# Patient Record
Sex: FEMALE | Race: WHITE | NOT HISPANIC OR LATINO | Employment: FULL TIME | URBAN - NONMETROPOLITAN AREA
[De-identification: names, ages, dates, MRNs, and addresses within clinical notes are randomized per-mention and may not be internally consistent; named-entity substitution may affect disease eponyms.]

---

## 2020-11-24 ENCOUNTER — OFFICE VISIT (OUTPATIENT)
Dept: CARDIOLOGY | Facility: CLINIC | Age: 40
End: 2020-11-24

## 2020-11-24 VITALS
WEIGHT: 159 LBS | HEART RATE: 92 BPM | DIASTOLIC BLOOD PRESSURE: 88 MMHG | HEIGHT: 65 IN | SYSTOLIC BLOOD PRESSURE: 124 MMHG | BODY MASS INDEX: 26.49 KG/M2

## 2020-11-24 DIAGNOSIS — E04.9 GOITER: ICD-10-CM

## 2020-11-24 DIAGNOSIS — R07.89 CHEST PRESSURE: Primary | ICD-10-CM

## 2020-11-24 DIAGNOSIS — I45.10 RBBB: ICD-10-CM

## 2020-11-24 DIAGNOSIS — E78.00 HYPERCHOLESTEREMIA: ICD-10-CM

## 2020-11-24 DIAGNOSIS — R51.9 HEADACHE DISORDER: ICD-10-CM

## 2020-11-24 DIAGNOSIS — R01.1 MURMUR, CARDIAC: ICD-10-CM

## 2020-11-24 DIAGNOSIS — R00.2 PALPITATIONS: ICD-10-CM

## 2020-11-24 PROCEDURE — 93000 ELECTROCARDIOGRAM COMPLETE: CPT | Performed by: INTERNAL MEDICINE

## 2020-11-24 PROCEDURE — 99244 OFF/OP CNSLTJ NEW/EST MOD 40: CPT | Performed by: INTERNAL MEDICINE

## 2020-11-24 RX ORDER — VORTIOXETINE 20 MG/1
20 TABLET, FILM COATED ORAL DAILY
COMMUNITY
Start: 2020-11-18

## 2020-11-24 NOTE — PROGRESS NOTES
Chief Complaint   Patient presents with   • Establish Care     Referred for CP and known history of incomplete RBBB   • Cardiac history     Seen Dr. KATT Jensen in 2013, she thinks had echo.  She was pregnant at that time and was on labetalol at that time.   • Labs     patient [facility]   • ASA     not on ASA   • Chest Pain     patient had one episode on 10/21/2020. started in center of chest and radiated to right side chest. last all day.  The next day symptoms were better. It has not reoccurred.  Dr. Casper recommended a check up with her history of incomplete RBBB        CARDIAC COMPLAINTS  chest pressure/discomfort and Headache      Subjective   Crescencio Kapadia is a 40 y.o. female came in today for her initial cardiac evaluation.  She has history of hypertension which was diagnosed when she was pregnant in 2013.  At that time she had preeclampsia and also has history of tachycardia.  She was seen by another cardiologist at that time she was treated with labetalol during the pregnancy which was stopped after the pregnancy is over.  She did not have any symptoms after that.  She has done fairly well for the last 7 years.  She had an episode of significant chest pain which started in the central part of the chest radiated to the right part of the chest.  It happened about a month ago.  The symptom lasted for almost a whole day.  It started in the morning when she was at home.  It persisted while she was at work.  In a scale of 1-10 it was around four.  He did not have any change in the intensity during the work.  She was seen at your office and the EKG apparently did not show any new changes other than the old right bundle branch block.  She is not sure that she had any labs done.  She is now referred for the evaluation of the chest discomfort.  She also complained of having multiple episode of intermittent migraine headache with an aura.  She sometimes takes medication if the symptom gets worse and  occasionally has to go home to rest.  She also has history of mild anxiety and depression for which she does take medication.  [facility] showed cholesterol is 209 but the LDL was 133.  Her TSH was normal at 2.76.  Her electrolytes were normal.  Her hemoglobin hematocrit was normal..  She has no history of smoking.  She drinks alcohol occasionally.  Both her parents have hypertension.    History reviewed. No pertinent surgical history.    Current Outpatient Medications   Medication Sig Dispense Refill   • Trintellix 20 MG tablet Take 20 mg by mouth Daily.       No current facility-administered medications for this visit.            ALLERGIES:  Patient has no known allergies.    Past Medical History:   Diagnosis Date   • Depression    • Hiatal hernia    • Hx of tonsillectomy 2012   • Hypertension    • Insomnia        Social History     Tobacco Use   Smoking Status Never Smoker   Smokeless Tobacco Never Used          Family History   Problem Relation Age of Onset   • Hypertension Mother    • Hypertension Father        Review of Systems   Constitution: Negative for decreased appetite and malaise/fatigue.   HENT: Negative for congestion and sore throat.    Eyes: Negative for blurred vision.   Cardiovascular: Positive for chest pain and palpitations.   Respiratory: Negative for shortness of breath and snoring.    Endocrine: Negative for cold intolerance and heat intolerance.   Hematologic/Lymphatic: Negative for adenopathy. Does not bruise/bleed easily.   Skin: Negative for itching, nail changes and skin cancer.   Musculoskeletal: Negative for arthritis and myalgias.   Gastrointestinal: Negative for abdominal pain, dysphagia and heartburn.   Genitourinary: Negative for bladder incontinence and frequency.   Neurological: Positive for headaches. Negative for dizziness, light-headedness, seizures and vertigo.   Psychiatric/Behavioral: Negative for altered mental status.   Allergic/Immunologic:  "Negative for environmental allergies and hives.       Diabetes- No  Thyroid- normal    Objective     /88   Pulse 92   Ht 165.1 cm (65\")   Wt 72.1 kg (159 lb)   BMI 26.46 kg/m²     Vitals signs and nursing note reviewed.   Constitutional:       Appearance: Healthy appearance. Not in distress.   Eyes:      Conjunctiva/sclera: Conjunctivae normal.      Pupils: Pupils are equal, round, and reactive to light.   HENT:      Head: Normocephalic.   Neck:      Musculoskeletal: Normal range of motion and neck supple.      Thyroid: Thyroid normal.   Pulmonary:      Effort: Pulmonary effort is normal.      Breath sounds: Normal breath sounds.   Cardiovascular:      PMI at left midclavicular line. Normal rate. Regular rhythm.      Murmurs: There is a grade 3/6 mid frequency midsystolic murmur at the URSB and apex.   Abdominal:      General: Bowel sounds are normal.      Palpations: Abdomen is soft.   Musculoskeletal: Normal range of motion.   Skin:     General: Skin is warm and dry.   Neurological:      Mental Status: Alert, oriented to person, place, and time and oriented to person, place and time.           ECG 12 Lead    Date/Time: 11/24/2020 5:08 PM  Performed by: Jerry De Souza MD  Authorized by: Jerry De Souza MD   Comparison: compared with previous ECG from 10/21/2020  Similar to previous ECG  Rhythm: sinus rhythm  Rate: normal  Conduction: right bundle branch block  QRS axis: normal    Clinical impression: abnormal EKG              Assessment/Plan   Patient's Body mass index is 26.46 kg/m². BMI is above normal parameters. Recommendations include: nutrition counseling.     Diagnoses and all orders for this visit:    1. Chest pressure (Primary)  -     Stress Test With Myocardial Perfusion One Day; Future    2. RBBB  -     Stress Test With Myocardial Perfusion One Day; Future    3. Palpitations  -     Adult Transthoracic Echo Complete W/ Cont if Necessary Per Protocol; Future    4. Headache disorder  -  "    Adult Transthoracic Echo Complete W/ Cont if Necessary Per Protocol; Future    5. Murmur, cardiac  -     Adult Transthoracic Echo Complete W/ Cont if Necessary Per Protocol; Future    6. Hypercholesteremia    7. Goiter  -     US thyroid; Future    On clinical examination her heart rate is upper limit of normal.  Her blood pressure is normal.  Her EKG showed sinus rhythm, right bundle branch block, ME interval is slightly short.  Her clinical examination reveals a BMI close to 26.  She does have a small goiter involving the right part of the thyroid.  Her cardiovascular examination is unremarkable other than a short systolic murmur at the mitral and the tricuspid area.  She has normal peripheral pulse and no pedal edema.    Regarding the chest pressure, given her abnormal EKG she needs cardiac work-up including a stress test to evaluate her functional status, chronotropic response, blood pressure response and rule out any stress-induced ischemia.  If it is normal she might benefit using PPI history of blockers as needed    Regarding her right bundle branch block and with her history of headache disorders, need to rule out PFO or ASD.  I advised her to undergo an echocardiogram to evaluate the LV function, valvular structures, PA pressure and also look for a shunt by doing it with saline injection    Regarding the palpitation which she gets occasionally, if she start getting more often then she may need to have a Holter monitor external monitor placed.  Also will evaluate during the stress test whether she does get any arrhythmia.    Regarding her cholesterol which is slightly elevated, I had a long talk with her about diet and weight reduction.  I talked to her about cutting down on the fried food and carbohydrate intake.    Regarding the small goiter, I advised her to check an ultrasound of the thyroid.               Electronically signed by Jerry De Souza MD November 24, 2020 17:01 EST

## 2020-12-18 ENCOUNTER — HOSPITAL ENCOUNTER (OUTPATIENT)
Dept: CARDIOLOGY | Facility: HOSPITAL | Age: 40
Discharge: HOME | End: 2020-12-18

## 2020-12-18 VITALS — HEIGHT: 65 IN | BODY MASS INDEX: 26.48 KG/M2 | WEIGHT: 158.95 LBS

## 2020-12-18 DIAGNOSIS — R07.89 CHEST PRESSURE: ICD-10-CM

## 2020-12-18 DIAGNOSIS — R51.9 HEADACHE DISORDER: ICD-10-CM

## 2020-12-18 DIAGNOSIS — R01.1 MURMUR, CARDIAC: ICD-10-CM

## 2020-12-18 DIAGNOSIS — R00.2 PALPITATIONS: ICD-10-CM

## 2020-12-18 DIAGNOSIS — I45.10 RBBB: ICD-10-CM

## 2020-12-18 LAB
AORTIC DIMENSIONLESS INDEX: 0.8 (DI)
BH CV ECHO MEAS - ACS: 1.7 CM
BH CV ECHO MEAS - AO MAX PG (FULL): 1.9 MMHG
BH CV ECHO MEAS - AO MAX PG: 5.9 MMHG
BH CV ECHO MEAS - AO MEAN PG (FULL): 1 MMHG
BH CV ECHO MEAS - AO MEAN PG: 3 MMHG
BH CV ECHO MEAS - AO ROOT AREA (BSA CORRECTED): 1.6
BH CV ECHO MEAS - AO ROOT AREA: 6.6 CM^2
BH CV ECHO MEAS - AO ROOT DIAM: 2.9 CM
BH CV ECHO MEAS - AO V2 MAX: 121 CM/SEC
BH CV ECHO MEAS - AO V2 MEAN: 82.8 CM/SEC
BH CV ECHO MEAS - AO V2 VTI: 26.6 CM
BH CV ECHO MEAS - BSA(HAYCOCK): 1.8 M^2
BH CV ECHO MEAS - BSA: 1.8 M^2
BH CV ECHO MEAS - BZI_BMI: 26.5 KILOGRAMS/M^2
BH CV ECHO MEAS - BZI_METRIC_HEIGHT: 165.1 CM
BH CV ECHO MEAS - BZI_METRIC_WEIGHT: 72.1 KG
BH CV ECHO MEAS - EDV(CUBED): 84.6 ML
BH CV ECHO MEAS - EDV(TEICH): 87.2 ML
BH CV ECHO MEAS - EF(CUBED): 74.1 %
BH CV ECHO MEAS - EF(TEICH): 66.1 %
BH CV ECHO MEAS - ESV(CUBED): 22 ML
BH CV ECHO MEAS - ESV(TEICH): 29.6 ML
BH CV ECHO MEAS - FS: 36.2 %
BH CV ECHO MEAS - IVS/LVPW: 1.1
BH CV ECHO MEAS - IVSD: 1.1 CM
BH CV ECHO MEAS - LA DIMENSION: 3.2 CM
BH CV ECHO MEAS - LA/AO: 1.1
BH CV ECHO MEAS - LAT PEAK E' VEL: 14.6 CM/SEC
BH CV ECHO MEAS - LV IVRT: 0.1 SEC
BH CV ECHO MEAS - LV MASS(C)D: 152.4 GRAMS
BH CV ECHO MEAS - LV MASS(C)DI: 84.9 GRAMS/M^2
BH CV ECHO MEAS - LV MAX PG: 3.9 MMHG
BH CV ECHO MEAS - LV MEAN PG: 2 MMHG
BH CV ECHO MEAS - LV V1 MAX: 99.2 CM/SEC
BH CV ECHO MEAS - LV V1 MEAN: 60.8 CM/SEC
BH CV ECHO MEAS - LV V1 VTI: 22.4 CM
BH CV ECHO MEAS - LVIDD: 4.4 CM
BH CV ECHO MEAS - LVIDS: 2.8 CM
BH CV ECHO MEAS - LVPWD: 0.98 CM
BH CV ECHO MEAS - MED PEAK E' VEL: 11.2 CM/SEC
BH CV ECHO MEAS - MV A MAX VEL: 63.7 CM/SEC
BH CV ECHO MEAS - MV DEC SLOPE: 409 CM/SEC^2
BH CV ECHO MEAS - MV DEC TIME: 0.22 SEC
BH CV ECHO MEAS - MV E MAX VEL: 89.6 CM/SEC
BH CV ECHO MEAS - MV E/A: 1.4
BH CV ECHO MEAS - MV MAX PG: 3 MMHG
BH CV ECHO MEAS - MV MEAN PG: 1 MMHG
BH CV ECHO MEAS - MV P1/2T MAX VEL: 99.5 CM/SEC
BH CV ECHO MEAS - MV P1/2T: 71.3 MSEC
BH CV ECHO MEAS - MV V2 MAX: 86.1 CM/SEC
BH CV ECHO MEAS - MV V2 MEAN: 53 CM/SEC
BH CV ECHO MEAS - MV V2 VTI: 27.2 CM
BH CV ECHO MEAS - MVA P1/2T LCG: 2.2 CM^2
BH CV ECHO MEAS - MVA(P1/2T): 3.1 CM^2
BH CV ECHO MEAS - PA MAX PG (FULL): 0.35 MMHG
BH CV ECHO MEAS - PA MAX PG: 5 MMHG
BH CV ECHO MEAS - PA MEAN PG (FULL): 0 MMHG
BH CV ECHO MEAS - PA MEAN PG: 2 MMHG
BH CV ECHO MEAS - PA V2 MAX: 112 CM/SEC
BH CV ECHO MEAS - PA V2 MEAN: 70 CM/SEC
BH CV ECHO MEAS - PA V2 VTI: 24.1 CM
BH CV ECHO MEAS - RAP SYSTOLE: 10 MMHG
BH CV ECHO MEAS - RV MAX PG: 4.7 MMHG
BH CV ECHO MEAS - RV MEAN PG: 2 MMHG
BH CV ECHO MEAS - RV V1 MAX: 108 CM/SEC
BH CV ECHO MEAS - RV V1 MEAN: 62.1 CM/SEC
BH CV ECHO MEAS - RV V1 VTI: 21.6 CM
BH CV ECHO MEAS - RVDD: 2.5 CM
BH CV ECHO MEAS - RVSP: 22 MMHG
BH CV ECHO MEAS - SI(AO): 97.9 ML/M^2
BH CV ECHO MEAS - SI(CUBED): 34.9 ML/M^2
BH CV ECHO MEAS - SI(TEICH): 32.1 ML/M^2
BH CV ECHO MEAS - SV(AO): 175.7 ML
BH CV ECHO MEAS - SV(CUBED): 62.7 ML
BH CV ECHO MEAS - SV(TEICH): 57.7 ML
BH CV ECHO MEAS - TR MAX VEL: 170 CM/SEC
BH CV ECHO MEASUREMENTS AVERAGE E/E' RATIO: 6.95
MAXIMAL PREDICTED HEART RATE: 180 BPM
STRESS TARGET HR: 153 BPM

## 2020-12-18 PROCEDURE — 78452 HT MUSCLE IMAGE SPECT MULT: CPT

## 2020-12-18 PROCEDURE — 93306 TTE W/DOPPLER COMPLETE: CPT | Performed by: INTERNAL MEDICINE

## 2020-12-18 PROCEDURE — A9500 TC99M SESTAMIBI: HCPCS | Performed by: INTERNAL MEDICINE

## 2020-12-18 PROCEDURE — 93016 CV STRESS TEST SUPVJ ONLY: CPT | Performed by: NURSE PRACTITIONER

## 2020-12-18 PROCEDURE — 0 TECHNETIUM SESTAMIBI: Performed by: INTERNAL MEDICINE

## 2020-12-18 PROCEDURE — 78452 HT MUSCLE IMAGE SPECT MULT: CPT | Performed by: INTERNAL MEDICINE

## 2020-12-18 PROCEDURE — 93356 MYOCRD STRAIN IMG SPCKL TRCK: CPT

## 2020-12-18 PROCEDURE — 93306 TTE W/DOPPLER COMPLETE: CPT

## 2020-12-18 PROCEDURE — 93356 MYOCRD STRAIN IMG SPCKL TRCK: CPT | Performed by: INTERNAL MEDICINE

## 2020-12-18 PROCEDURE — 93017 CV STRESS TEST TRACING ONLY: CPT

## 2020-12-18 PROCEDURE — 93018 CV STRESS TEST I&R ONLY: CPT | Performed by: INTERNAL MEDICINE

## 2020-12-18 RX ORDER — SODIUM CHLORIDE 9 MG/ML
8 INJECTION INTRAMUSCULAR; INTRAVENOUS; SUBCUTANEOUS AS NEEDED
Status: DISCONTINUED | OUTPATIENT
Start: 2020-12-18 | End: 2020-12-19 | Stop reason: HOSPADM

## 2020-12-18 RX ADMIN — SODIUM CHLORIDE 8 ML: 9 INJECTION, SOLUTION INTRAMUSCULAR; INTRAVENOUS; SUBCUTANEOUS at 13:09

## 2020-12-18 RX ADMIN — TECHNETIUM TC 99M SESTAMIBI 1 DOSE: 1 INJECTION INTRAVENOUS at 13:27

## 2020-12-18 RX ADMIN — TECHNETIUM TC 99M SESTAMIBI 1 DOSE: 1 INJECTION INTRAVENOUS at 13:29

## 2020-12-19 LAB
BH CV STRESS RECOVERY BP: NORMAL MMHG
BH CV STRESS RECOVERY HR: 113 BPM
LV EF NUC BP: 53 %
MAXIMAL PREDICTED HEART RATE: 180 BPM
PERCENT MAX PREDICTED HR: 104.44 %
STRESS BASELINE BP: NORMAL MMHG
STRESS BASELINE HR: 85 BPM
STRESS PERCENT HR: 123 %
STRESS POST ESTIMATED WORKLOAD: 7 METS
STRESS POST EXERCISE DUR MIN: 6 MIN
STRESS POST EXERCISE DUR SEC: 0 SEC
STRESS POST PEAK BP: NORMAL MMHG
STRESS POST PEAK HR: 188 BPM
STRESS TARGET HR: 153 BPM

## 2020-12-21 RX ORDER — METOPROLOL SUCCINATE 50 MG/1
50 TABLET, EXTENDED RELEASE ORAL DAILY
Qty: 90 TABLET | Refills: 3 | Status: SHIPPED | OUTPATIENT
Start: 2020-12-21 | End: 2021-01-27 | Stop reason: DRUGHIGH

## 2020-12-21 NOTE — TELEPHONE ENCOUNTER
Patient was made aware of results of stress and echo. EF 53%, no ischemia, increased HR and BP response. Patient was made aware to add metoprolol XL 50 mg to start with 1/2 tablet a day and then increase to 1 whole tablet.     Script pended to be sent to [facility] in [place]. 30 tablets and 11 refills.

## 2021-01-27 ENCOUNTER — OFFICE VISIT (OUTPATIENT)
Dept: CARDIOLOGY | Facility: CLINIC | Age: 41
End: 2021-01-27

## 2021-01-27 VITALS
TEMPERATURE: 96.2 F | SYSTOLIC BLOOD PRESSURE: 144 MMHG | BODY MASS INDEX: 26.82 KG/M2 | HEIGHT: 65 IN | WEIGHT: 161 LBS | DIASTOLIC BLOOD PRESSURE: 80 MMHG | HEART RATE: 72 BPM

## 2021-01-27 DIAGNOSIS — I49.3 PVC (PREMATURE VENTRICULAR CONTRACTION): ICD-10-CM

## 2021-01-27 DIAGNOSIS — I45.10 RBBB: ICD-10-CM

## 2021-01-27 DIAGNOSIS — R01.1 MURMUR, CARDIAC: ICD-10-CM

## 2021-01-27 DIAGNOSIS — E78.00 HYPERCHOLESTEREMIA: ICD-10-CM

## 2021-01-27 DIAGNOSIS — R51.9 HEADACHE DISORDER: ICD-10-CM

## 2021-01-27 DIAGNOSIS — R00.2 PALPITATIONS: Primary | ICD-10-CM

## 2021-01-27 PROCEDURE — 99214 OFFICE O/P EST MOD 30 MIN: CPT | Performed by: INTERNAL MEDICINE

## 2021-01-27 RX ORDER — METOPROLOL SUCCINATE 50 MG/1
50 TABLET, EXTENDED RELEASE ORAL DAILY
Qty: 120 TABLET | Refills: 3 | Status: SHIPPED | OUTPATIENT
Start: 2021-01-27 | End: 2021-08-03

## 2021-01-27 RX ORDER — METOPROLOL SUCCINATE 50 MG/1
50 TABLET, EXTENDED RELEASE ORAL DAILY
COMMUNITY
End: 2021-01-27

## 2021-01-27 NOTE — PROGRESS NOTES
Chief Complaint   Patient presents with   • Follow-up     for cardiac management   • Med Refill     no refills currently needed   • Palpitations     still having palpitations about the same as before starting the metoprolol, occuring daily   • Labs     most recent labs from Sept are in chart.    • Aspirin     does not take a daily aspirin       CARDIAC COMPLAINTS  palpitations        Subjective   Crescencio Kapadia is a 40 y.o. female came in today for her follow-up visit.  She has history of hypertension who was seen at our office 2 months ago for chest pain shortness of breath and palpitation.  She was noted to be mildly tachycardic.  She underwent investigation in the form of echocardiogram and a stress test.  The stress test showed increased chronotropic response, hypertensive blood pressure response and breast attenuation.  She did have frequent PVC's during the peak exercise.  Echocardiogram shows normal LV function and no shunt.  Toprol-XL was started at 25 mg and increased to 50 mg.  She came today stating that she still has some palpitation.  She has not been having any more chest pain.  Her overall endurance has improved.  She is able to walk more on the treadmill and a higher speed and higher inclination.  She also able to do little more weight lifting.              Cardiac History  Past Surgical History:   Procedure Laterality Date   • CARDIOVASCULAR STRESS TEST  12/18/2020    6 Min.7.0Mets.104% THR. BP- 171/93. PVC. EF 53%. Breast Attenuation   • ECHO - CONVERTED  12/18/2020    EF 65%. Trace MR. RVSP- 22 mmHg. No shunt       Current Outpatient Medications   Medication Sig Dispense Refill   • Trintellix 20 MG tablet Take 20 mg by mouth Daily.     • metoprolol succinate XL (TOPROL-XL) 50 MG 24 hr tablet Take 1 tablet by mouth Daily. Take 75 mg. 1 and 1/2 Tab / Daily 120 tablet 3     No current facility-administered medications for this visit.        Allergies  :  Patient has no known allergies.       Past  "Medical History:   Diagnosis Date   • Depression    • Hiatal hernia    • Hx of tonsillectomy 2012   • Hypertension    • Insomnia        Social History     Socioeconomic History   • Marital status:      Spouse name: Not on file   • Number of children: Not on file   • Years of education: Not on file   • Highest education level: Not on file   Tobacco Use   • Smoking status: Never Smoker   • Smokeless tobacco: Never Used   Substance and Sexual Activity   • Alcohol use: Yes     Comment: rare, maybe 1 or 2 per month   • Drug use: Never       Family History   Problem Relation Age of Onset   • Hypertension Mother    • Hypertension Father        Review of Systems   Constitution: Negative for decreased appetite and malaise/fatigue.   HENT: Negative for congestion and sore throat.    Eyes: Negative for blurred vision.   Cardiovascular: Positive for palpitations. Negative for chest pain.   Respiratory: Negative for shortness of breath and snoring.    Endocrine: Negative for cold intolerance and heat intolerance.   Hematologic/Lymphatic: Negative for adenopathy. Does not bruise/bleed easily.   Skin: Negative for itching, nail changes and skin cancer.   Musculoskeletal: Negative for arthritis and myalgias.   Gastrointestinal: Negative for abdominal pain, dysphagia and heartburn.   Genitourinary: Negative for bladder incontinence and frequency.   Neurological: Negative for dizziness, light-headedness, seizures and vertigo.   Psychiatric/Behavioral: Negative for altered mental status.   Allergic/Immunologic: Negative for environmental allergies and hives.       Diabetes- No  Thyroid- normal    Objective     /80   Pulse 72   Temp 96.2 °F (35.7 °C)   Ht 165.1 cm (65\")   Wt 73 kg (161 lb)   BMI 26.79 kg/m²     Vitals signs and nursing note reviewed.   Constitutional:       Appearance: Healthy appearance. Not in distress.   Eyes:      Conjunctiva/sclera: Conjunctivae normal.      Pupils: Pupils are equal, round, and " reactive to light.   HENT:      Head: Normocephalic.   Neck:      Musculoskeletal: Normal range of motion and neck supple.   Pulmonary:      Effort: Pulmonary effort is normal.      Breath sounds: Normal breath sounds.   Cardiovascular:      PMI at left midclavicular line. Normal rate. Regular rhythm.      Murmurs: There is a grade 3/6 mid frequency systolic murmur at the apex.   Abdominal:      General: Bowel sounds are normal.      Palpations: Abdomen is soft.   Musculoskeletal: Normal range of motion.   Skin:     General: Skin is warm and dry.   Neurological:      Mental Status: Alert, oriented to person, place, and time and oriented to person, place and time.       Procedures            Assessment/Plan   Patient's Body mass index is 26.79 kg/m². BMI is above normal parameters. Recommendations include: nutrition counseling.     Diagnoses and all orders for this visit:    1. Palpitations (Primary)  -     metoprolol succinate XL (TOPROL-XL) 50 MG 24 hr tablet; Take 1 tablet by mouth Daily. Take 75 mg. 1 and 1/2 Tab / Daily  Dispense: 120 tablet; Refill: 3  -     Holter Monitor - 72 Hour Up To 15 Days; Future    2. RBBB    3. Murmur, cardiac    4. Hypercholesteremia    5. Headache disorder    6. PVC (premature ventricular contraction)  -     metoprolol succinate XL (TOPROL-XL) 50 MG 24 hr tablet; Take 1 tablet by mouth Daily. Take 75 mg. 1 and 1/2 Tab / Daily  Dispense: 120 tablet; Refill: 3  -     Holter Monitor - 72 Hour Up To 15 Days; Future    At baseline her heart rate is much better than before.  Her blood pressure is still borderline elevated.  Her BMI is still around 27.  Her clinical examination is unremarkable other than a short systolic murmur at the mitral area.    Regarding the palpitation, I increased the dose of Toprol-XL to 75 mg once a day.  I also going to place 1 week Holter monitor.  If there is evidence of frequent PVC's then will consider adding 1C antiarrhythmic medication.    Regarding her  hypercholesterolemia the [facility] and [facility] 133.  That needs to be rechecked along with the LFT    Regarding her headache, she does not have any shunt by echocardiogram.  Her headache is better with beta-blockers.  Continue the same    Regarding the PVC which we noted in the stress test if there is very frequent PVC in the monitor then we may have to adjust the medication based upon the frequency and the timing of the PVC    Regarding her metabolic syndrome, I talked to her again about low-carb diet..    Based on the results, further recommendations will be made.                    Electronically signed by Jerry De Souza MD January 27, 2021 16:52 EST

## 2021-04-26 ENCOUNTER — TELEPHONE (OUTPATIENT)
Dept: CARDIOLOGY | Facility: CLINIC | Age: 41
End: 2021-04-26

## 2021-04-26 NOTE — TELEPHONE ENCOUNTER
Received fax from Dr. Rubi for cardiac clearance for patient to have a shoulder arthroscopy. According to our records, I do not see where patient is on any blood thinners or has had any stenting.       Fax [phone]

## 2021-08-03 ENCOUNTER — OFFICE VISIT (OUTPATIENT)
Dept: CARDIOLOGY | Facility: CLINIC | Age: 41
End: 2021-08-03

## 2021-08-03 VITALS
BODY MASS INDEX: 28.92 KG/M2 | HEIGHT: 65 IN | HEART RATE: 82 BPM | WEIGHT: 173.6 LBS | SYSTOLIC BLOOD PRESSURE: 118 MMHG | DIASTOLIC BLOOD PRESSURE: 78 MMHG

## 2021-08-03 DIAGNOSIS — R00.2 PALPITATIONS: Primary | ICD-10-CM

## 2021-08-03 DIAGNOSIS — E88.81 METABOLIC SYNDROME: ICD-10-CM

## 2021-08-03 DIAGNOSIS — R73.9 HYPERGLYCEMIA: ICD-10-CM

## 2021-08-03 DIAGNOSIS — E55.9 VITAMIN D DEFICIENCY: ICD-10-CM

## 2021-08-03 DIAGNOSIS — I49.3 PVC (PREMATURE VENTRICULAR CONTRACTION): ICD-10-CM

## 2021-08-03 DIAGNOSIS — R53.82 CHRONIC FATIGUE: ICD-10-CM

## 2021-08-03 DIAGNOSIS — R63.5 WEIGHT GAIN: ICD-10-CM

## 2021-08-03 DIAGNOSIS — R01.1 MURMUR, CARDIAC: ICD-10-CM

## 2021-08-03 DIAGNOSIS — E78.00 HYPERCHOLESTEREMIA: ICD-10-CM

## 2021-08-03 PROBLEM — E88.810 METABOLIC SYNDROME: Status: ACTIVE | Noted: 2021-08-03

## 2021-08-03 PROCEDURE — 99214 OFFICE O/P EST MOD 30 MIN: CPT | Performed by: INTERNAL MEDICINE

## 2021-08-03 RX ORDER — METOPROLOL SUCCINATE 50 MG/1
50 TABLET, EXTENDED RELEASE ORAL DAILY
Qty: 120 TABLET | Refills: 3 | Status: SHIPPED | OUTPATIENT
Start: 2021-08-03 | End: 2022-01-31

## 2021-08-03 NOTE — PROGRESS NOTES
Chief Complaint   Patient presents with   • Follow-up     Cardiac managment .Has no cardiac complaints today.    • LABS     No current labs   • Med Refill     No refills needed today.        CARDIAC COMPLAINTS  fatigue and Weight Gain        Subjective   Crescencio Kapadia is a 40 y.o. female came in today for her regular follow-up visit.  She was initially seen for chest pain, shortness of breath and palpitation.  She was noted to be slightly tachycardic.  Her stress test showed hypertensive changes as well as increased chronotropic response she did have some PVC during the peak exercise.  Echocardiogram shows normal LV function.  She was started on Toprol-XL at 25 mg once a day and increase to 50 mg once a day.  She then underwent Holter monitor which showed very rare PAC and PVC.  She did better with 75 mg of Lopressor.  She came today feeling fatigued and tired.  She has been gaining weight instead of losing weight.  She did undergo shoulder surgery without any complication but after the surgery she has not been ambulating adequately and started gaining weight.              Cardiac History  Past Surgical History:   Procedure Laterality Date   • CARDIOVASCULAR STRESS TEST  12/18/2020    6 Min.7.0Mets.104% THR. BP- 171/93. PVC. EF 53%. Breast Attenuation   • CONVERTED (HISTORICAL) HOLTER  02/18/2021    <10 Days.. . Rare PAC and PVC   • ECHO - CONVERTED  12/18/2020    EF 65%. Trace MR. RVSP- 22 mmHg. No shunt       Current Outpatient Medications   Medication Sig Dispense Refill   • metoprolol succinate XL (TOPROL-XL) 50 MG 24 hr tablet Take 1 tablet by mouth Daily. Take 50 mg daily 120 tablet 3   • Trintellix 20 MG tablet Take 20 mg by mouth Daily.       No current facility-administered medications for this visit.       Allergies  :  Patient has no known allergies.       Past Medical History:   Diagnosis Date   • Depression    • Hiatal hernia    • Hx of tonsillectomy 2012   • Hypertension    • Insomnia        Social  "History     Socioeconomic History   • Marital status:      Spouse name: Not on file   • Number of children: Not on file   • Years of education: Not on file   • Highest education level: Not on file   Tobacco Use   • Smoking status: Never Smoker   • Smokeless tobacco: Never Used   Vaping Use   • Vaping Use: Never used   Substance and Sexual Activity   • Alcohol use: Yes     Comment: rare, maybe 1 or 2 per month   • Drug use: Never       Family History   Problem Relation Age of Onset   • Hypertension Mother    • Hypertension Father        Review of Systems   Constitutional: Positive for malaise/fatigue and weight gain. Negative for decreased appetite.   HENT: Negative for congestion and sore throat.    Eyes: Negative for blurred vision.   Cardiovascular: Negative for chest pain and palpitations.   Respiratory: Negative for shortness of breath and snoring.    Endocrine: Negative for cold intolerance and heat intolerance.   Hematologic/Lymphatic: Negative for adenopathy. Does not bruise/bleed easily.   Skin: Negative for itching, nail changes and skin cancer.   Musculoskeletal: Negative for arthritis and myalgias.   Gastrointestinal: Negative for abdominal pain, dysphagia and heartburn.   Genitourinary: Negative for bladder incontinence and frequency.   Neurological: Negative for dizziness, light-headedness, seizures and vertigo.   Psychiatric/Behavioral: Negative for altered mental status.   Allergic/Immunologic: Negative for environmental allergies and hives.       Diabetes- No  Thyroid- normal    Objective     /78 (BP Location: Right arm)   Pulse 82   Ht 165.1 cm (65\")   Wt 78.7 kg (173 lb 9.6 oz)   BMI 28.89 kg/m²     Vitals and nursing note reviewed.   Constitutional:       Appearance: Healthy appearance. Not in distress.   Eyes:      Conjunctiva/sclera: Conjunctivae normal.      Pupils: Pupils are equal, round, and reactive to light.   HENT:      Head: Normocephalic.   Pulmonary:      Effort: " Pulmonary effort is normal.      Breath sounds: Normal breath sounds.   Cardiovascular:      PMI at left midclavicular line. Normal rate. Regular rhythm.      Murmurs: There is a systolic murmur.   Abdominal:      General: Bowel sounds are normal.      Palpations: Abdomen is soft.   Musculoskeletal: Normal range of motion.      Cervical back: Normal range of motion and neck supple. Skin:     General: Skin is warm and dry.   Neurological:      Mental Status: Alert, oriented to person, place, and time and oriented to person, place and time.       Procedures            Assessment/Plan   Patient's Body mass index is 28.89 kg/m². indicating that she is overweight (BMI 25-29.9). Obesity-related health conditions include the following: none. Obesity is worsening. BMI is is above average; BMI management plan is completed. We discussed portion control and increasing exercise..     Diagnoses and all orders for this visit:    1. Palpitations (Primary)  -     Comprehensive Metabolic Panel; Future  -     TSH; Future  -     metoprolol succinate XL (TOPROL-XL) 50 MG 24 hr tablet; Take 1 tablet by mouth Daily. Take 50 mg daily  Dispense: 120 tablet; Refill: 3    2. Hypercholesteremia  -     Lipid Panel; Future    3. Murmur, cardiac    4. PVC (premature ventricular contraction)  -     metoprolol succinate XL (TOPROL-XL) 50 MG 24 hr tablet; Take 1 tablet by mouth Daily. Take 50 mg daily  Dispense: 120 tablet; Refill: 3    5. Metabolic syndrome  -     CBC & Differential; Future    6. Weight gain  -     Cortisol; Future    7. Chronic fatigue  -     Vitamin B12; Future  -     Folate; Future  -     Cortisol; Future  -     Overnight Sleep Oximetry Study; Future    8. Hyperglycemia  -     Hemoglobin A1c; Future    9. Vitamin D deficiency  -     Vitamin D 25 Hydroxy; Future       At baseline her heart rate is stable.  Her blood pressure is normal.  Her BMI is gone to 29.  Her clinical examination is unremarkable.  She has no edema at this  time.    Regarding the palpitation, she seems to be doing better now.  I advised her to cut down the Toprol-XL back to 50 mg twice a day.  She is advised to have a electrolytes checked along with a TSH level.    Regarding her hypercholesterolemia, it was slightly elevated in the past now with the weight gain that needs to be rechecked.  If the LDL has gone up significantly then she may need to go on a statin    Regarding the PVC's the metoprolol seems to be doing very well.  So we can cut it down to 50 mg and see whether she can tolerating it.    Regarding the metabolic syndrome, I had a very long talk with her about the diet especially low-carb diet.  I also talked to her about intermittent fasting diet.  She is also advised to undergo some lab work including cortisol level    Regarding her chronic fatigue, advised her to check a vitamin B12, folic acid level.  She is also advised to have a nocturnal pulse PO2 measurement done    We will continue the metoprolol and I talked to her in detail again about weight loss.                    Electronically signed by Jerry De Souza MD August 3, 2021 16:18 EDT

## 2021-08-17 ENCOUNTER — LAB (OUTPATIENT)
Dept: LAB | Facility: HOSPITAL | Age: 41
End: 2021-08-17

## 2021-08-17 DIAGNOSIS — E88.81 METABOLIC SYNDROME: ICD-10-CM

## 2021-08-17 DIAGNOSIS — R63.5 WEIGHT GAIN: ICD-10-CM

## 2021-08-17 DIAGNOSIS — E78.00 HYPERCHOLESTEREMIA: ICD-10-CM

## 2021-08-17 DIAGNOSIS — R00.2 PALPITATIONS: ICD-10-CM

## 2021-08-17 DIAGNOSIS — E55.9 VITAMIN D DEFICIENCY: ICD-10-CM

## 2021-08-17 DIAGNOSIS — R53.82 CHRONIC FATIGUE: ICD-10-CM

## 2021-08-17 DIAGNOSIS — R73.9 HYPERGLYCEMIA: ICD-10-CM

## 2021-08-17 LAB
ALBUMIN SERPL-MCNC: 4.3 G/DL (ref 3.5–5.2)
ALBUMIN/GLOB SERPL: 1.5 G/DL
ALP SERPL-CCNC: 86 U/L (ref 39–117)
ALT SERPL W P-5'-P-CCNC: 12 U/L (ref 1–33)
ANION GAP SERPL CALCULATED.3IONS-SCNC: 12.8 MMOL/L (ref 5–15)
AST SERPL-CCNC: 16 U/L (ref 1–32)
BASOPHILS # BLD AUTO: 0.07 10*3/MM3 (ref 0–0.2)
BASOPHILS NFR BLD AUTO: 0.8 % (ref 0–1.5)
BILIRUB SERPL-MCNC: 0.4 MG/DL (ref 0–1.2)
BUN SERPL-MCNC: 12 MG/DL (ref 6–20)
BUN/CREAT SERPL: 14.5 (ref 7–25)
CALCIUM SPEC-SCNC: 9.3 MG/DL (ref 8.6–10.5)
CHLORIDE SERPL-SCNC: 101 MMOL/L (ref 98–107)
CHOLEST SERPL-MCNC: 203 MG/DL (ref 0–200)
CO2 SERPL-SCNC: 23.2 MMOL/L (ref 22–29)
CORTIS SERPL-MCNC: 9.01 MCG/DL
CREAT SERPL-MCNC: 0.83 MG/DL (ref 0.57–1)
DEPRECATED RDW RBC AUTO: 44.5 FL (ref 37–54)
EOSINOPHIL # BLD AUTO: 0.12 10*3/MM3 (ref 0–0.4)
EOSINOPHIL NFR BLD AUTO: 1.4 % (ref 0.3–6.2)
ERYTHROCYTE [DISTWIDTH] IN BLOOD BY AUTOMATED COUNT: 12.6 % (ref 12.3–15.4)
GFR SERPL CREATININE-BSD FRML MDRD: 76 ML/MIN/1.73
GLOBULIN UR ELPH-MCNC: 2.8 GM/DL
GLUCOSE SERPL-MCNC: 96 MG/DL (ref 65–99)
HBA1C MFR BLD: 5.1 % (ref 4.8–5.6)
HCT VFR BLD AUTO: 41.3 % (ref 34–46.6)
HDLC SERPL-MCNC: 56 MG/DL (ref 40–60)
HGB BLD-MCNC: 13.1 G/DL (ref 12–15.9)
IMM GRANULOCYTES # BLD AUTO: 0.05 10*3/MM3 (ref 0–0.05)
IMM GRANULOCYTES NFR BLD AUTO: 0.6 % (ref 0–0.5)
LDLC SERPL CALC-MCNC: 127 MG/DL (ref 0–100)
LDLC/HDLC SERPL: 2.22 {RATIO}
LYMPHOCYTES # BLD AUTO: 2.27 10*3/MM3 (ref 0.7–3.1)
LYMPHOCYTES NFR BLD AUTO: 26.5 % (ref 19.6–45.3)
MCH RBC QN AUTO: 30.6 PG (ref 26.6–33)
MCHC RBC AUTO-ENTMCNC: 31.7 G/DL (ref 31.5–35.7)
MCV RBC AUTO: 96.5 FL (ref 79–97)
MONOCYTES # BLD AUTO: 0.54 10*3/MM3 (ref 0.1–0.9)
MONOCYTES NFR BLD AUTO: 6.3 % (ref 5–12)
NEUTROPHILS NFR BLD AUTO: 5.53 10*3/MM3 (ref 1.7–7)
NEUTROPHILS NFR BLD AUTO: 64.4 % (ref 42.7–76)
NRBC BLD AUTO-RTO: 0 /100 WBC (ref 0–0.2)
PLATELET # BLD AUTO: 306 10*3/MM3 (ref 140–450)
PMV BLD AUTO: 9.7 FL (ref 6–12)
POTASSIUM SERPL-SCNC: 4.3 MMOL/L (ref 3.5–5.2)
PROT SERPL-MCNC: 7.1 G/DL (ref 6–8.5)
RBC # BLD AUTO: 4.28 10*6/MM3 (ref 3.77–5.28)
SODIUM SERPL-SCNC: 137 MMOL/L (ref 136–145)
TRIGL SERPL-MCNC: 113 MG/DL (ref 0–150)
TSH SERPL DL<=0.05 MIU/L-ACNC: 3.16 UIU/ML (ref 0.27–4.2)
VLDLC SERPL-MCNC: 20 MG/DL (ref 5–40)
WBC # BLD AUTO: 8.58 10*3/MM3 (ref 3.4–10.8)

## 2021-08-17 PROCEDURE — 80053 COMPREHEN METABOLIC PANEL: CPT

## 2021-08-17 PROCEDURE — 82607 VITAMIN B-12: CPT

## 2021-08-17 PROCEDURE — 83036 HEMOGLOBIN GLYCOSYLATED A1C: CPT

## 2021-08-17 PROCEDURE — 36415 COLL VENOUS BLD VENIPUNCTURE: CPT

## 2021-08-17 PROCEDURE — 82533 TOTAL CORTISOL: CPT

## 2021-08-17 PROCEDURE — 82306 VITAMIN D 25 HYDROXY: CPT

## 2021-08-17 PROCEDURE — 84443 ASSAY THYROID STIM HORMONE: CPT

## 2021-08-17 PROCEDURE — 82746 ASSAY OF FOLIC ACID SERUM: CPT

## 2021-08-17 PROCEDURE — 85025 COMPLETE CBC W/AUTO DIFF WBC: CPT

## 2021-08-17 PROCEDURE — 80061 LIPID PANEL: CPT

## 2021-08-18 LAB
25(OH)D3 SERPL-MCNC: 34.2 NG/ML (ref 30–100)
FOLATE SERPL-MCNC: >20 NG/ML (ref 4.78–24.2)
VIT B12 BLD-MCNC: 461 PG/ML (ref 211–946)

## 2022-01-29 DIAGNOSIS — I49.3 PVC (PREMATURE VENTRICULAR CONTRACTION): ICD-10-CM

## 2022-01-29 DIAGNOSIS — R00.2 PALPITATIONS: ICD-10-CM

## 2022-01-31 RX ORDER — METOPROLOL SUCCINATE 50 MG/1
TABLET, EXTENDED RELEASE ORAL
Qty: 120 TABLET | Refills: 2 | Status: SHIPPED | OUTPATIENT
Start: 2022-01-31 | End: 2022-08-04 | Stop reason: DRUGHIGH

## 2022-08-04 ENCOUNTER — OFFICE VISIT (OUTPATIENT)
Dept: CARDIOLOGY | Facility: CLINIC | Age: 42
End: 2022-08-04

## 2022-08-04 VITALS
SYSTOLIC BLOOD PRESSURE: 148 MMHG | WEIGHT: 166 LBS | HEIGHT: 65 IN | DIASTOLIC BLOOD PRESSURE: 90 MMHG | HEART RATE: 72 BPM | BODY MASS INDEX: 27.66 KG/M2

## 2022-08-04 DIAGNOSIS — R01.1 MURMUR, CARDIAC: ICD-10-CM

## 2022-08-04 DIAGNOSIS — L23.7 POISON IVY: ICD-10-CM

## 2022-08-04 DIAGNOSIS — E88.81 METABOLIC SYNDROME: ICD-10-CM

## 2022-08-04 DIAGNOSIS — U07.1 COVID-19 VIRUS INFECTION: ICD-10-CM

## 2022-08-04 DIAGNOSIS — I49.3 PVC (PREMATURE VENTRICULAR CONTRACTION): ICD-10-CM

## 2022-08-04 DIAGNOSIS — E78.00 HYPERCHOLESTEREMIA: ICD-10-CM

## 2022-08-04 DIAGNOSIS — E04.9 GOITER: ICD-10-CM

## 2022-08-04 DIAGNOSIS — R00.2 PALPITATIONS: Primary | ICD-10-CM

## 2022-08-04 PROCEDURE — 99214 OFFICE O/P EST MOD 30 MIN: CPT | Performed by: INTERNAL MEDICINE

## 2022-08-04 RX ORDER — METOPROLOL SUCCINATE 50 MG/1
50 TABLET, EXTENDED RELEASE ORAL DAILY
Qty: 90 TABLET | Refills: 4 | Status: SHIPPED | OUTPATIENT
Start: 2022-08-04

## 2022-08-04 RX ORDER — METOPROLOL SUCCINATE 50 MG/1
50 TABLET, EXTENDED RELEASE ORAL DAILY
COMMUNITY
End: 2022-08-04 | Stop reason: SDUPTHER

## 2022-08-04 RX ORDER — PREDNISONE 10 MG/1
TABLET ORAL
Qty: 10 TABLET | Refills: 1 | Status: SHIPPED | OUTPATIENT
Start: 2022-08-04

## 2022-08-04 RX ORDER — METOPROLOL SUCCINATE 50 MG/1
50 TABLET, EXTENDED RELEASE ORAL DAILY
Qty: 90 TABLET | Refills: 4 | Status: SHIPPED | OUTPATIENT
Start: 2022-08-04 | End: 2022-08-04 | Stop reason: SDUPTHER

## 2022-08-04 NOTE — PROGRESS NOTES
Chief Complaint   Patient presents with   • Follow-up     For cardiac management   • Med Refill     Refills needed on metoprolol. 90 days to [facility]   • Palpitations     Only 2-3 times a week. Nothing significant.   • Labs     No recent labs       CARDIAC COMPLAINTS  palpitations        Subjective   Crescencio Kapadia is a 41 y.o. female came in today for her follow-up visit.  She has history of palpitation who also had chest pain and shortness of breath.  Cardiac work-up showed increased chronotropic response and hypertensive blood pressure response along with PVC.  Low-dose of beta-blockers were started and gradually increase.  She was not able to tolerate the 75 mg so it was cut down to 50 mg.  She came today feeling much better now.  She still gets some palpitation but nothing as before.  Her last set of labs I have is about a year ago.  Her cholesterol was elevated at that time with the LDL of 127 and she went on a strict diet.  Her TSH was normal.  Apparently she was exposed to poison ivy recently and she is having rashes which was quite itching.  She tried steroid cream which apparently did not help.              Cardiac History  Past Surgical History:   Procedure Laterality Date   • CARDIOVASCULAR STRESS TEST  12/18/2020    6 Min.7.0Mets.104% THR. BP- 171/93. PVC. EF 53%. Breast Attenuation   • CONVERTED (HISTORICAL) HOLTER  02/18/2021    <10 Days.. . Rare PAC and PVC   • ECHO - CONVERTED  12/18/2020    EF 65%. Trace MR. RVSP- 22 mmHg. No shunt   • OTHER SURGICAL HISTORY  09/21/2021    Pulse O2- Normal       Current Outpatient Medications   Medication Sig Dispense Refill   • metoprolol succinate XL (TOPROL-XL) 50 MG 24 hr tablet Take 1 tablet by mouth Daily. 90 tablet 4   • Trintellix 20 MG tablet Take 20 mg by mouth Daily.     • predniSONE (DELTASONE) 10 MG (21) dose pack Use as directed on package 10 tablet 1     No current facility-administered medications for this visit.       Allergies  :  Patient  "has no known allergies.       Past Medical History:   Diagnosis Date   • Depression    • Hiatal hernia    • Hx of tonsillectomy 2012   • Hypertension    • Insomnia        Social History     Socioeconomic History   • Marital status:    Tobacco Use   • Smoking status: Never Smoker   • Smokeless tobacco: Never Used   Vaping Use   • Vaping Use: Never used   Substance and Sexual Activity   • Alcohol use: Yes     Comment: rare, maybe 1 or 2 per month   • Drug use: Never       Family History   Problem Relation Age of Onset   • Hypertension Mother    • Hypertension Father        Review of Systems   Constitutional: Negative for decreased appetite and malaise/fatigue.   HENT: Negative for congestion and sore throat.    Eyes: Negative for blurred vision.   Cardiovascular: Positive for palpitations. Negative for chest pain.   Respiratory: Negative for shortness of breath and snoring.    Endocrine: Negative for cold intolerance and heat intolerance.   Hematologic/Lymphatic: Negative for adenopathy. Does not bruise/bleed easily.   Skin: Positive for itching and rash. Negative for nail changes and skin cancer.   Musculoskeletal: Negative for arthritis and myalgias.   Gastrointestinal: Negative for abdominal pain, dysphagia and heartburn.   Genitourinary: Negative for bladder incontinence and frequency.   Neurological: Negative for dizziness, light-headedness, seizures and vertigo.   Psychiatric/Behavioral: Negative for altered mental status.   Allergic/Immunologic: Negative for environmental allergies and hives.       Diabetes- No  Thyroid- normal    Objective     /90   Pulse 72   Ht 165.1 cm (65\")   Wt 75.3 kg (166 lb)   BMI 27.62 kg/m²     Vitals and nursing note reviewed.   Constitutional:       Appearance: Not in distress.   Eyes:      Conjunctiva/sclera: Conjunctivae normal.      Pupils: Pupils are equal, round, and reactive to light.   HENT:      Head: Normocephalic.   Pulmonary:      Effort: Pulmonary " effort is normal.      Breath sounds: Normal breath sounds.   Cardiovascular:      PMI at left midclavicular line. Normal rate. Regular rhythm.      Murmurs: There is a systolic murmur.   Abdominal:      General: Bowel sounds are normal.      Palpations: Abdomen is soft.   Musculoskeletal: Normal range of motion.      Cervical back: Normal range of motion and neck supple. Skin:     General: Skin is warm and dry.      Findings: Rash present.   Neurological:      Mental Status: Alert, oriented to person, place, and time and oriented to person, place and time.       Procedures            @ASSESSMENT/PLAN@  BMI is >= 25 and <30. (Overweight) The following options were offered after discussion;: nutrition counseling/recommendations     Diagnoses and all orders for this visit:    1. Palpitations (Primary)  -     Discontinue: metoprolol succinate XL (TOPROL-XL) 50 MG 24 hr tablet; Take 1 tablet by mouth Daily.  Dispense: 90 tablet; Refill: 4  -     metoprolol succinate XL (TOPROL-XL) 50 MG 24 hr tablet; Take 1 tablet by mouth Daily.  Dispense: 90 tablet; Refill: 4  -     Comprehensive Metabolic Panel; Future  -     TSH; Future    2. PVC (premature ventricular contraction)  -     Discontinue: metoprolol succinate XL (TOPROL-XL) 50 MG 24 hr tablet; Take 1 tablet by mouth Daily.  Dispense: 90 tablet; Refill: 4  -     metoprolol succinate XL (TOPROL-XL) 50 MG 24 hr tablet; Take 1 tablet by mouth Daily.  Dispense: 90 tablet; Refill: 4    3. Metabolic syndrome  -     Comprehensive Metabolic Panel; Future  -     CBC & Differential; Future    4. Hypercholesteremia  -     Lipid Panel; Future    5. Murmur, cardiac    6. COVID-19 virus infection    7. Goiter  -     TSH; Future    8. Poison ivy  -     predniSONE (DELTASONE) 10 MG (21) dose pack; Use as directed on package  Dispense: 10 tablet; Refill: 1       At baseline her heart rate is stable.  Her blood pressure is upper limit of normal.  Her clinical examination reveals a BMI of  28.  She does have small goiter.  She does have some rashes on leg which seems to be secondary to poison ivy.    Regarding the palpitation, at this time continue the Toprol at 50 mg.  She needs to have her electrolytes checked along with TSH level.    Regarding the PVC, it seems to be very well controlled with Toprol-XL so continue the same    Regarding the metabolic syndrome, talked to her again about diet especially intermittent fasting diet which she is trying to do.  I advised her to check her electrolytes as well as a blood count    Regarding her hypercholesterolemia, she has not tried any statin.  We will recheck her lipid profile again    Regarding the cardiac murmur, it seems to be secondary to mild mitral regurgitation and I do not think we need to do any further testing    Regarding her history of COVID-19, she has recovered completely    Regarding the goiter, check the TSH level    Regarding her poison ivy, I gave her prescription for prednisone Dosepak.    Overall cardiac status appears stable.  I will see her back in 1 year or sooner if needed.                  Electronically signed by Jerry De Souza MD August 4, 2022 17:43 EDT

## 2022-09-07 ENCOUNTER — LAB (OUTPATIENT)
Dept: LAB | Facility: HOSPITAL | Age: 42
End: 2022-09-07

## 2022-09-07 DIAGNOSIS — E78.00 HYPERCHOLESTEREMIA: ICD-10-CM

## 2022-09-07 DIAGNOSIS — E88.81 METABOLIC SYNDROME: ICD-10-CM

## 2022-09-07 DIAGNOSIS — R00.2 PALPITATIONS: ICD-10-CM

## 2022-09-07 DIAGNOSIS — E04.9 GOITER: ICD-10-CM

## 2022-09-07 LAB
ALBUMIN SERPL-MCNC: 4.61 G/DL (ref 3.5–5.2)
ALBUMIN/GLOB SERPL: 2.3 G/DL
ALP SERPL-CCNC: 69 U/L (ref 39–117)
ALT SERPL W P-5'-P-CCNC: 8 U/L (ref 1–33)
ANION GAP SERPL CALCULATED.3IONS-SCNC: 13.8 MMOL/L (ref 5–15)
AST SERPL-CCNC: 12 U/L (ref 1–32)
BASOPHILS # BLD AUTO: 0.08 10*3/MM3 (ref 0–0.2)
BASOPHILS NFR BLD AUTO: 1.1 % (ref 0–1.5)
BILIRUB SERPL-MCNC: 0.4 MG/DL (ref 0–1.2)
BUN SERPL-MCNC: 16 MG/DL (ref 6–20)
BUN/CREAT SERPL: 20.3 (ref 7–25)
CALCIUM SPEC-SCNC: 9.8 MG/DL (ref 8.6–10.5)
CHLORIDE SERPL-SCNC: 100 MMOL/L (ref 98–107)
CHOLEST SERPL-MCNC: 206 MG/DL (ref 0–200)
CO2 SERPL-SCNC: 24.2 MMOL/L (ref 22–29)
CREAT SERPL-MCNC: 0.79 MG/DL (ref 0.57–1)
DEPRECATED RDW RBC AUTO: 42.7 FL (ref 37–54)
EGFRCR SERPLBLD CKD-EPI 2021: 95.9 ML/MIN/1.73
EOSINOPHIL # BLD AUTO: 0.16 10*3/MM3 (ref 0–0.4)
EOSINOPHIL NFR BLD AUTO: 2.3 % (ref 0.3–6.2)
ERYTHROCYTE [DISTWIDTH] IN BLOOD BY AUTOMATED COUNT: 12.1 % (ref 12.3–15.4)
GLOBULIN UR ELPH-MCNC: 2 GM/DL
GLUCOSE SERPL-MCNC: 94 MG/DL (ref 65–99)
HCT VFR BLD AUTO: 40.4 % (ref 34–46.6)
HDLC SERPL-MCNC: 58 MG/DL (ref 40–60)
HGB BLD-MCNC: 13.4 G/DL (ref 12–15.9)
IMM GRANULOCYTES # BLD AUTO: 0.04 10*3/MM3 (ref 0–0.05)
IMM GRANULOCYTES NFR BLD AUTO: 0.6 % (ref 0–0.5)
LDLC SERPL CALC-MCNC: 128 MG/DL (ref 0–100)
LDLC/HDLC SERPL: 2.16 {RATIO}
LYMPHOCYTES # BLD AUTO: 2.56 10*3/MM3 (ref 0.7–3.1)
LYMPHOCYTES NFR BLD AUTO: 36.5 % (ref 19.6–45.3)
MCH RBC QN AUTO: 31.8 PG (ref 26.6–33)
MCHC RBC AUTO-ENTMCNC: 33.2 G/DL (ref 31.5–35.7)
MCV RBC AUTO: 96 FL (ref 79–97)
MONOCYTES # BLD AUTO: 0.42 10*3/MM3 (ref 0.1–0.9)
MONOCYTES NFR BLD AUTO: 6 % (ref 5–12)
NEUTROPHILS NFR BLD AUTO: 3.76 10*3/MM3 (ref 1.7–7)
NEUTROPHILS NFR BLD AUTO: 53.5 % (ref 42.7–76)
NRBC BLD AUTO-RTO: 0 /100 WBC (ref 0–0.2)
PLATELET # BLD AUTO: 322 10*3/MM3 (ref 140–450)
PMV BLD AUTO: 9.8 FL (ref 6–12)
POTASSIUM SERPL-SCNC: 4.9 MMOL/L (ref 3.5–5.2)
PROT SERPL-MCNC: 6.6 G/DL (ref 6–8.5)
RBC # BLD AUTO: 4.21 10*6/MM3 (ref 3.77–5.28)
SODIUM SERPL-SCNC: 138 MMOL/L (ref 136–145)
TRIGL SERPL-MCNC: 113 MG/DL (ref 0–150)
TSH SERPL DL<=0.05 MIU/L-ACNC: 3.89 UIU/ML (ref 0.27–4.2)
VLDLC SERPL-MCNC: 20 MG/DL (ref 5–40)
WBC NRBC COR # BLD: 7.02 10*3/MM3 (ref 3.4–10.8)

## 2022-09-07 PROCEDURE — 80050 GENERAL HEALTH PANEL: CPT | Performed by: INTERNAL MEDICINE

## 2022-09-07 PROCEDURE — 80061 LIPID PANEL: CPT | Performed by: INTERNAL MEDICINE

## 2023-08-07 ENCOUNTER — OFFICE VISIT (OUTPATIENT)
Dept: CARDIOLOGY | Facility: CLINIC | Age: 43
End: 2023-08-07
Payer: COMMERCIAL

## 2023-08-07 VITALS
WEIGHT: 157 LBS | HEIGHT: 65 IN | DIASTOLIC BLOOD PRESSURE: 80 MMHG | SYSTOLIC BLOOD PRESSURE: 114 MMHG | BODY MASS INDEX: 26.16 KG/M2 | HEART RATE: 84 BPM

## 2023-08-07 DIAGNOSIS — E88.81 METABOLIC SYNDROME: ICD-10-CM

## 2023-08-07 DIAGNOSIS — R51.9 HEADACHE DISORDER: ICD-10-CM

## 2023-08-07 DIAGNOSIS — R00.2 PALPITATIONS: Primary | ICD-10-CM

## 2023-08-07 DIAGNOSIS — I49.3 PVC (PREMATURE VENTRICULAR CONTRACTION): ICD-10-CM

## 2023-08-07 DIAGNOSIS — E78.00 HYPERCHOLESTEREMIA: ICD-10-CM

## 2023-08-07 DIAGNOSIS — E04.9 GOITER: ICD-10-CM

## 2023-08-07 PROCEDURE — 99214 OFFICE O/P EST MOD 30 MIN: CPT | Performed by: INTERNAL MEDICINE

## 2023-08-07 RX ORDER — METOPROLOL SUCCINATE 50 MG/1
50 TABLET, EXTENDED RELEASE ORAL DAILY
Qty: 90 TABLET | Refills: 4 | Status: SHIPPED | OUTPATIENT
Start: 2023-08-07

## 2023-08-07 NOTE — LETTER
August 7, 2023       No Recipients    Patient: Crescencio Kapadia   YOB: 1980   Date of Visit: 8/7/2023     Dear Rene Casper MD:       Thank you for referring Crescencio Kapadia to me for evaluation. Below are the relevant portions of my assessment and plan of care.    If you have questions, please do not hesitate to call me. I look forward to following Crescencio along with you.         Sincerely,        Jerry De Souza MD        CC:   No Recipients    Jerry De Souza MD  08/07/23 1339  Sign when Signing Visit  Chief Complaint   Patient presents with    Follow-up     For cardiac management, doing well. Denies any cardiac problems.     Med Refill     Refills needed on metoprolol, 90 days to [facility].     Labs     No recent labs, would be ok to have labs at our lab if you feel necessary.       CARDIAC COMPLAINTS  Cardiac management        Subjective  Crescencio Kapadia is a 42 y.o. female came in today for her annual follow-up visit.  She has history of palpitation, chest pain and shortness of breath.  She was found to have mild hypertensive blood pressure response and increased chronotropic response along with PVC.  She was put on beta-blockers and she has done fairly well with that.  She also has dyslipidemia for which she wants to try dietary restriction which she did.  She did undergo lab work at [facility] in May.  Her cholesterol came down to 147 triglycerides 155 HDL 42 LDL came down to 85.  Her liver function is normal.  She came today with no new symptoms.  She is feeling much better.  Hardly has any more chest pain.  Able to do more activities than before.              Cardiac History  Past Surgical History:   Procedure Laterality Date    CARDIOVASCULAR STRESS TEST  12/18/2020    6 Min.7.0Mets.104% THR. BP- 171/93. PVC. EF 53%. Breast Attenuation    CONVERTED (HISTORICAL) HOLTER  02/18/2021    <10 Days.. . Rare PAC and PVC    ECHO - CONVERTED  12/18/2020    EF 65%. Trace MR. RVSP- 22 mmHg. No  shunt    OTHER SURGICAL HISTORY  09/21/2021    Pulse O2- Normal       Current Outpatient Medications   Medication Sig Dispense Refill    metoprolol succinate XL (TOPROL-XL) 50 MG 24 hr tablet Take 1 tablet by mouth Daily. 90 tablet 4    Trintellix 20 MG tablet Take 1 tablet by mouth Daily.      predniSONE (DELTASONE) 10 MG (21) dose pack Use as directed on package 10 tablet 1     No current facility-administered medications for this visit.       Allergies  :  Patient has no known allergies.       Past Medical History:   Diagnosis Date    Depression     Hiatal hernia     Hx of tonsillectomy 2012    Hypertension     Insomnia        Social History     Socioeconomic History    Marital status:    Tobacco Use    Smoking status: Never    Smokeless tobacco: Never   Vaping Use    Vaping Use: Never used   Substance and Sexual Activity    Alcohol use: Yes     Comment: rare, maybe 1 or 2 per month    Drug use: Never       Family History   Problem Relation Age of Onset    Hypertension Mother     Hypertension Father        Review of Systems   Constitutional: Negative for decreased appetite and malaise/fatigue.   HENT:  Negative for congestion and sore throat.    Eyes:  Negative for blurred vision, double vision and visual disturbance.   Cardiovascular:  Negative for chest pain and palpitations.   Respiratory:  Negative for shortness of breath and snoring.    Endocrine: Negative for cold intolerance and heat intolerance.   Hematologic/Lymphatic: Negative for adenopathy. Does not bruise/bleed easily.   Skin:  Negative for itching, nail changes and skin cancer.   Musculoskeletal:  Negative for arthritis and myalgias.   Gastrointestinal:  Negative for abdominal pain, dysphagia and heartburn.   Genitourinary:  Negative for bladder incontinence and frequency.   Neurological:  Negative for dizziness, seizures and vertigo.   Psychiatric/Behavioral:  Negative for altered mental status.    Allergic/Immunologic:  "Negative for environmental allergies and hives.     Diabetes- No  Thyroid- normal    Objective    /80   Pulse 84   Ht 165.1 cm (65\")   Wt 71.2 kg (157 lb)   BMI 26.13 kg/mý     Vitals and nursing note reviewed.   Constitutional:       Appearance: Healthy appearance. Not in distress.   Eyes:      Conjunctiva/sclera: Conjunctivae normal.      Pupils: Pupils are equal, round, and reactive to light.   HENT:      Head: Normocephalic.   Pulmonary:      Effort: Pulmonary effort is normal.      Breath sounds: Normal breath sounds.   Cardiovascular:      PMI at left midclavicular line. Normal rate. Regular rhythm.   Abdominal:      General: Bowel sounds are normal.      Palpations: Abdomen is soft.   Musculoskeletal: Normal range of motion.      Cervical back: Normal range of motion and neck supple. Skin:     General: Skin is warm and dry.   Neurological:      Mental Status: Alert, oriented to person, place, and time and oriented to person, place and time.   Procedures            @ASSESSMENT/PLAN@  BMI is >= 25 and <30. (Overweight) The following options were offered after discussion;: nutrition counseling/recommendations     Diagnoses and all orders for this visit:    1. Palpitations (Primary)  -     metoprolol succinate XL (TOPROL-XL) 50 MG 24 hr tablet; Take 1 tablet by mouth Daily.  Dispense: 90 tablet; Refill: 4  -     Comprehensive Metabolic Panel; Future    2. Hypercholesteremia  -     Lipid Panel; Future    3. Goiter  -     CBC & Differential; Future  -     TSH; Future    4. Metabolic syndrome    5. Headache disorder    6. PVC (premature ventricular contraction)  -     metoprolol succinate XL (TOPROL-XL) 50 MG 24 hr tablet; Take 1 tablet by mouth Daily.  Dispense: 90 tablet; Refill: 4  -     TSH; Future  -     Magnesium; Future       At baseline her heart rate is stable.  Her blood pressure is normal.  Her BMI is around 26.  She has lost few pounds since I have seen her.  Her cardiovascular examination is " otherwise unremarkable    Regarding the palpitation which is secondary to PVC, beta-blocker seems to be helping her.  Continue the Toprol at the same dose.    Regarding her hypercholesterolemia, with restricted diet her LDL has come down less than 100.  At this time I do not think she needs to be on any statin    Regarding her history of goiter, thyroid function test apparently was normal.  Continue to monitor    Regarding the metabolic syndrome, she has lost some weight.  Encouraged her to continue to do so.    Regarding her chronic headache disorder, it seems to have subsided.  No need for further workup    Overall cardiac status appears stable.  I will see her back in a year or sooner if needed.                  Electronically signed by Jerry De Souza MD August 7, 2023 13:35 EDT

## 2023-08-07 NOTE — PROGRESS NOTES
Chief Complaint   Patient presents with    Follow-up     For cardiac management, doing well. Denies any cardiac problems.     Med Refill     Refills needed on metoprolol, 90 days to [facility].     Labs     No recent labs, would be ok to have labs at our lab if you feel necessary.       CARDIAC COMPLAINTS  Cardiac management        Subjective   Crescencio Kapadia is a 42 y.o. female came in today for her annual follow-up visit.  She has history of palpitation, chest pain and shortness of breath.  She was found to have mild hypertensive blood pressure response and increased chronotropic response along with PVC.  She was put on beta-blockers and she has done fairly well with that.  She also has dyslipidemia for which she wants to try dietary restriction which she did.  She did undergo lab work at [facility] in May.  Her cholesterol came down to 147 triglycerides 155 HDL 42 LDL came down to 85.  Her liver function is normal.  She came today with no new symptoms.  She is feeling much better.  Hardly has any more chest pain.  Able to do more activities than before.              Cardiac History  Past Surgical History:   Procedure Laterality Date    CARDIOVASCULAR STRESS TEST  12/18/2020    6 Min.7.0Mets.104% THR. BP- 171/93. PVC. EF 53%. Breast Attenuation    CONVERTED (HISTORICAL) HOLTER  02/18/2021    <10 Days.. . Rare PAC and PVC    ECHO - CONVERTED  12/18/2020    EF 65%. Trace MR. RVSP- 22 mmHg. No shunt    OTHER SURGICAL HISTORY  09/21/2021    Pulse O2- Normal       Current Outpatient Medications   Medication Sig Dispense Refill    metoprolol succinate XL (TOPROL-XL) 50 MG 24 hr tablet Take 1 tablet by mouth Daily. 90 tablet 4    Trintellix 20 MG tablet Take 1 tablet by mouth Daily.      predniSONE (DELTASONE) 10 MG (21) dose pack Use as directed on package 10 tablet 1     No current facility-administered medications for this visit.       Allergies  :  Patient has no known allergies.       Past Medical History:  "  Diagnosis Date    Depression     Hiatal hernia     Hx of tonsillectomy 2012    Hypertension     Insomnia        Social History     Socioeconomic History    Marital status:    Tobacco Use    Smoking status: Never    Smokeless tobacco: Never   Vaping Use    Vaping Use: Never used   Substance and Sexual Activity    Alcohol use: Yes     Comment: rare, maybe 1 or 2 per month    Drug use: Never       Family History   Problem Relation Age of Onset    Hypertension Mother     Hypertension Father        Review of Systems   Constitutional: Negative for decreased appetite and malaise/fatigue.   HENT:  Negative for congestion and sore throat.    Eyes:  Negative for blurred vision, double vision and visual disturbance.   Cardiovascular:  Negative for chest pain and palpitations.   Respiratory:  Negative for shortness of breath and snoring.    Endocrine: Negative for cold intolerance and heat intolerance.   Hematologic/Lymphatic: Negative for adenopathy. Does not bruise/bleed easily.   Skin:  Negative for itching, nail changes and skin cancer.   Musculoskeletal:  Negative for arthritis and myalgias.   Gastrointestinal:  Negative for abdominal pain, dysphagia and heartburn.   Genitourinary:  Negative for bladder incontinence and frequency.   Neurological:  Negative for dizziness, seizures and vertigo.   Psychiatric/Behavioral:  Negative for altered mental status.    Allergic/Immunologic: Negative for environmental allergies and hives.     Diabetes- No  Thyroid- normal    Objective     /80   Pulse 84   Ht 165.1 cm (65\")   Wt 71.2 kg (157 lb)   BMI 26.13 kg/mý     Vitals and nursing note reviewed.   Constitutional:       Appearance: Healthy appearance. Not in distress.   Eyes:      Conjunctiva/sclera: Conjunctivae normal.      Pupils: Pupils are equal, round, and reactive to light.   HENT:      Head: Normocephalic.   Pulmonary:      Effort: Pulmonary effort is normal.      Breath sounds: Normal breath " sounds.   Cardiovascular:      PMI at left midclavicular line. Normal rate. Regular rhythm.   Abdominal:      General: Bowel sounds are normal.      Palpations: Abdomen is soft.   Musculoskeletal: Normal range of motion.      Cervical back: Normal range of motion and neck supple. Skin:     General: Skin is warm and dry.   Neurological:      Mental Status: Alert, oriented to person, place, and time and oriented to person, place and time.   Procedures            @ASSESSMENT/PLAN@  BMI is >= 25 and <30. (Overweight) The following options were offered after discussion;: nutrition counseling/recommendations     Diagnoses and all orders for this visit:    1. Palpitations (Primary)  -     metoprolol succinate XL (TOPROL-XL) 50 MG 24 hr tablet; Take 1 tablet by mouth Daily.  Dispense: 90 tablet; Refill: 4  -     Comprehensive Metabolic Panel; Future    2. Hypercholesteremia  -     Lipid Panel; Future    3. Goiter  -     CBC & Differential; Future  -     TSH; Future    4. Metabolic syndrome    5. Headache disorder    6. PVC (premature ventricular contraction)  -     metoprolol succinate XL (TOPROL-XL) 50 MG 24 hr tablet; Take 1 tablet by mouth Daily.  Dispense: 90 tablet; Refill: 4  -     TSH; Future  -     Magnesium; Future       At baseline her heart rate is stable.  Her blood pressure is normal.  Her BMI is around 26.  She has lost few pounds since I have seen her.  Her cardiovascular examination is otherwise unremarkable    Regarding the palpitation which is secondary to PVC, beta-blocker seems to be helping her.  Continue the Toprol at the same dose.    Regarding her hypercholesterolemia, with restricted diet her LDL has come down less than 100.  At this time I do not think she needs to be on any statin    Regarding her history of goiter, thyroid function test apparently was normal.  Continue to monitor    Regarding the metabolic syndrome, she has lost some weight.  Encouraged her to continue to do so.    Regarding her  chronic headache disorder, it seems to have subsided.  No need for further workup    Overall cardiac status appears stable.  I will see her back in a year or sooner if needed.                  Electronically signed by Jerry De Souza MD August 7, 2023 13:35 EDT

## 2024-04-12 ENCOUNTER — TELEPHONE (OUTPATIENT)
Dept: CARDIOLOGY | Facility: CLINIC | Age: 44
End: 2024-04-12
Payer: COMMERCIAL

## 2024-04-12 NOTE — TELEPHONE ENCOUNTER
Received fax from JORDAN Fonseca, [facility] for cardiac clearance for patient to start a stimulant medication, methylphenidate. According to our records, patient has had PVC's and does take metoprolol XL 50 mg daily.          Fax [phone]

## 2024-06-17 ENCOUNTER — TELEPHONE (OUTPATIENT)
Dept: CARDIOLOGY | Facility: CLINIC | Age: 44
End: 2024-06-17
Payer: COMMERCIAL

## 2024-06-17 NOTE — TELEPHONE ENCOUNTER
Patient made aware of overdue labs and patient was open to getting the blood work completed before their next appointment at our testing facility.

## 2024-07-23 ENCOUNTER — LAB (OUTPATIENT)
Dept: LAB | Facility: HOSPITAL | Age: 44
End: 2024-07-23
Payer: COMMERCIAL

## 2024-07-23 DIAGNOSIS — I49.3 PVC (PREMATURE VENTRICULAR CONTRACTION): ICD-10-CM

## 2024-07-23 DIAGNOSIS — E04.9 GOITER: ICD-10-CM

## 2024-07-23 DIAGNOSIS — E78.00 HYPERCHOLESTEREMIA: ICD-10-CM

## 2024-07-23 DIAGNOSIS — R00.2 PALPITATIONS: ICD-10-CM

## 2024-07-23 LAB
ALBUMIN SERPL-MCNC: 4.4 G/DL (ref 3.5–5.2)
ALBUMIN/GLOB SERPL: 1.5 G/DL
ALP SERPL-CCNC: 53 U/L (ref 39–117)
ALT SERPL W P-5'-P-CCNC: 9 U/L (ref 1–33)
ANION GAP SERPL CALCULATED.3IONS-SCNC: 11.3 MMOL/L (ref 5–15)
AST SERPL-CCNC: 13 U/L (ref 1–32)
BASOPHILS # BLD AUTO: 0.05 10*3/MM3 (ref 0–0.2)
BASOPHILS NFR BLD AUTO: 0.9 % (ref 0–1.5)
BILIRUB SERPL-MCNC: 0.5 MG/DL (ref 0–1.2)
BUN SERPL-MCNC: 12 MG/DL (ref 6–20)
BUN/CREAT SERPL: 13.8 (ref 7–25)
CALCIUM SPEC-SCNC: 9.5 MG/DL (ref 8.6–10.5)
CHLORIDE SERPL-SCNC: 102 MMOL/L (ref 98–107)
CHOLEST SERPL-MCNC: 170 MG/DL (ref 0–200)
CO2 SERPL-SCNC: 21.7 MMOL/L (ref 22–29)
CREAT SERPL-MCNC: 0.87 MG/DL (ref 0.57–1)
DEPRECATED RDW RBC AUTO: 42.5 FL (ref 37–54)
EGFRCR SERPLBLD CKD-EPI 2021: 84.9 ML/MIN/1.73
EOSINOPHIL # BLD AUTO: 0.08 10*3/MM3 (ref 0–0.4)
EOSINOPHIL NFR BLD AUTO: 1.4 % (ref 0.3–6.2)
ERYTHROCYTE [DISTWIDTH] IN BLOOD BY AUTOMATED COUNT: 12.2 % (ref 12.3–15.4)
GLOBULIN UR ELPH-MCNC: 2.9 GM/DL
GLUCOSE SERPL-MCNC: 92 MG/DL (ref 65–99)
HCT VFR BLD AUTO: 41.1 % (ref 34–46.6)
HDLC SERPL-MCNC: 58 MG/DL (ref 40–60)
HGB BLD-MCNC: 13.6 G/DL (ref 12–15.9)
IMM GRANULOCYTES # BLD AUTO: 0.03 10*3/MM3 (ref 0–0.05)
IMM GRANULOCYTES NFR BLD AUTO: 0.5 % (ref 0–0.5)
LDLC SERPL CALC-MCNC: 101 MG/DL (ref 0–100)
LDLC/HDLC SERPL: 1.74 {RATIO}
LYMPHOCYTES # BLD AUTO: 1.75 10*3/MM3 (ref 0.7–3.1)
LYMPHOCYTES NFR BLD AUTO: 31 % (ref 19.6–45.3)
MAGNESIUM SERPL-MCNC: 1.9 MG/DL (ref 1.6–2.6)
MCH RBC QN AUTO: 31.9 PG (ref 26.6–33)
MCHC RBC AUTO-ENTMCNC: 33.1 G/DL (ref 31.5–35.7)
MCV RBC AUTO: 96.3 FL (ref 79–97)
MONOCYTES # BLD AUTO: 0.39 10*3/MM3 (ref 0.1–0.9)
MONOCYTES NFR BLD AUTO: 6.9 % (ref 5–12)
NEUTROPHILS NFR BLD AUTO: 3.34 10*3/MM3 (ref 1.7–7)
NEUTROPHILS NFR BLD AUTO: 59.3 % (ref 42.7–76)
NRBC BLD AUTO-RTO: 0 /100 WBC (ref 0–0.2)
PLATELET # BLD AUTO: 278 10*3/MM3 (ref 140–450)
PMV BLD AUTO: 9.8 FL (ref 6–12)
POTASSIUM SERPL-SCNC: 4.5 MMOL/L (ref 3.5–5.2)
PROT SERPL-MCNC: 7.3 G/DL (ref 6–8.5)
RBC # BLD AUTO: 4.27 10*6/MM3 (ref 3.77–5.28)
SODIUM SERPL-SCNC: 135 MMOL/L (ref 136–145)
TRIGL SERPL-MCNC: 55 MG/DL (ref 0–150)
TSH SERPL DL<=0.05 MIU/L-ACNC: 2.78 UIU/ML (ref 0.27–4.2)
VLDLC SERPL-MCNC: 11 MG/DL (ref 5–40)
WBC NRBC COR # BLD AUTO: 5.64 10*3/MM3 (ref 3.4–10.8)

## 2024-07-23 PROCEDURE — 80061 LIPID PANEL: CPT

## 2024-07-23 PROCEDURE — 36415 COLL VENOUS BLD VENIPUNCTURE: CPT

## 2024-07-23 PROCEDURE — 83735 ASSAY OF MAGNESIUM: CPT

## 2024-07-23 PROCEDURE — 80050 GENERAL HEALTH PANEL: CPT

## 2024-07-24 NOTE — PROGRESS NOTES
Pt aware of lab results and recommendations that LDL is improving to continue current regimen and to monitor her sodium since it is mildly diminished. Understanding voiced. Faxing a copy to PCP for his records.

## 2024-08-07 ENCOUNTER — OFFICE VISIT (OUTPATIENT)
Dept: CARDIOLOGY | Facility: CLINIC | Age: 44
End: 2024-08-07
Payer: COMMERCIAL

## 2024-08-07 VITALS
HEART RATE: 92 BPM | HEIGHT: 65 IN | BODY MASS INDEX: 25.33 KG/M2 | DIASTOLIC BLOOD PRESSURE: 62 MMHG | SYSTOLIC BLOOD PRESSURE: 112 MMHG | WEIGHT: 152 LBS

## 2024-08-07 DIAGNOSIS — R00.2 PALPITATIONS: ICD-10-CM

## 2024-08-07 DIAGNOSIS — I49.3 PVC (PREMATURE VENTRICULAR CONTRACTION): ICD-10-CM

## 2024-08-07 DIAGNOSIS — E78.00 HYPERCHOLESTEREMIA: ICD-10-CM

## 2024-08-07 DIAGNOSIS — R51.9 HEADACHE DISORDER: Primary | ICD-10-CM

## 2024-08-07 PROCEDURE — 99214 OFFICE O/P EST MOD 30 MIN: CPT | Performed by: INTERNAL MEDICINE

## 2024-08-07 RX ORDER — METOPROLOL SUCCINATE 50 MG/1
50 TABLET, EXTENDED RELEASE ORAL DAILY
Qty: 90 TABLET | Refills: 4 | Status: SHIPPED | OUTPATIENT
Start: 2024-08-07

## 2024-08-07 RX ORDER — SEMAGLUTIDE 2.4 MG/.75ML
1 INJECTION, SOLUTION SUBCUTANEOUS
COMMUNITY

## 2024-08-07 NOTE — LETTER
August 7, 2024       No Recipients    Patient: Crescencio Kapadia   YOB: 1980   Date of Visit: 8/7/2024     Dear Rene Casper MD:       Thank you for referring Crescencio Kapadia to me for evaluation. Below are the relevant portions of my assessment and plan of care.    If you have questions, please do not hesitate to call me. I look forward to following Crescencio along with you.         Sincerely,        Jerry De Souza MD        CC:   No Recipients    Jerry De Souza MD  08/07/24 1403  Sign when Signing Visit  Chief Complaint   Patient presents with   • Follow-up     For cardiac management, doing well, has been on Wegovy, Dr Casper has her weaning off to see if she can hold her weight. Has occasional palpitations but seems to be directly related to anxiety.    • Labs     Pt recently had labs, results in [facility].    • Med Refill     Refills needed on Toprol XL, 90 days to [facility] .    • Medication Problem     Pt forgot to take the metoprolol this morning.        CARDIAC COMPLAINTS  Cardiac management        Subjective  Crescencio Kapadia is a 43 y.o. female came in today for her annual follow-up visit.  She has history of chest pain, shortness of breath and palpitation.  She underwent few investigation found to have PAC's and PVCs.  Her echocardiogram showed normal LV function with no shunt.  Stress test showed only breast attenuation.  Her nocturnal pulse oxygen was normal.  She did undergo lab work last month.  TSH was normal cholesterol has come down to 170 and the LDL has come down to 101 with just diet.  Her blood count appears within normal limit sodium was slightly low at 135 rest of them were normal magnesium was normal.  The only symptom she has now is headache.  She does get intermittent headache.  She also being weaned off Wegovy.              Cardiac History  Past Surgical History:   Procedure Laterality Date   • CARDIOVASCULAR STRESS TEST  12/18/2020    6 Min.7.0Mets.104% THR. BP- 171/93. PVC. EF 53%.  Breast Attenuation   • CONVERTED (HISTORICAL) HOLTER  02/18/2021    <10 Days.. . Rare PAC and PVC   • ECHO - CONVERTED  12/18/2020    EF 65%. Trace MR. RVSP- 22 mmHg. No shunt   • OTHER SURGICAL HISTORY  09/21/2021    Pulse O2- Normal       Current Outpatient Medications   Medication Sig Dispense Refill   • metoprolol succinate XL (TOPROL-XL) 50 MG 24 hr tablet Take 1 tablet by mouth Daily. 90 tablet 4   • Trintellix 20 MG tablet Take 1 tablet by mouth Daily.     • Wegovy 2.4 MG/0.75ML solution auto-injector Inject 1 syringe under the skin into the appropriate area as directed. Every 2-3 weeks       No current facility-administered medications for this visit.       Allergies  :  Patient has no known allergies.       Past Medical History:   Diagnosis Date   • Depression    • Hiatal hernia    • Hx of tonsillectomy 2012   • Hypertension    • Insomnia        Social History     Socioeconomic History   • Marital status:    Tobacco Use   • Smoking status: Never   • Smokeless tobacco: Never   Vaping Use   • Vaping status: Never Used   Substance and Sexual Activity   • Alcohol use: Yes     Comment: rare, maybe 1 or 2 per month   • Drug use: Never       Family History   Problem Relation Age of Onset   • Hypertension Mother    • Hypertension Father        Review of Systems   Constitutional: Negative for decreased appetite and malaise/fatigue.   HENT:  Negative for congestion and sore throat.    Eyes:  Negative for blurred vision, double vision and visual disturbance.   Cardiovascular:  Negative for chest pain and palpitations.   Respiratory:  Negative for shortness of breath and snoring.    Endocrine: Negative for cold intolerance and heat intolerance.   Hematologic/Lymphatic: Negative for adenopathy. Does not bruise/bleed easily.   Skin:  Negative for itching, nail changes and skin cancer.   Musculoskeletal:  Negative for arthritis and myalgias.   Gastrointestinal:  Negative for abdominal pain, dysphagia and  "heartburn.   Genitourinary:  Negative for bladder incontinence and frequency.   Neurological:  Positive for headaches. Negative for dizziness, seizures and vertigo.   Psychiatric/Behavioral:  Negative for altered mental status.    Allergic/Immunologic: Negative for environmental allergies and hives.     Diabetes- No  Thyroid- normal    Objective    /62   Pulse 92   Ht 165.1 cm (65\")   Wt 68.9 kg (152 lb)   BMI 25.29 kg/m²     Vitals and nursing note reviewed.   Constitutional:       Appearance: Healthy appearance. Not in distress.   Eyes:      Conjunctiva/sclera: Conjunctivae normal.      Pupils: Pupils are equal, round, and reactive to light.   HENT:      Head: Normocephalic.   Pulmonary:      Effort: Pulmonary effort is normal.      Breath sounds: Normal breath sounds.   Cardiovascular:      PMI at left midclavicular line. Normal rate. Regular rhythm.   Abdominal:      General: Bowel sounds are normal.      Palpations: Abdomen is soft.   Musculoskeletal: Normal range of motion.      Cervical back: Normal range of motion and neck supple. Skin:     General: Skin is warm and dry.   Neurological:      Mental Status: Alert, oriented to person, place, and time and oriented to person, place and time.   Procedures            @ASSESSMENT/PLAN@        Diagnoses and all orders for this visit:    1. Headache disorder (Primary)    2. Palpitations  -     metoprolol succinate XL (TOPROL-XL) 50 MG 24 hr tablet; Take 1 tablet by mouth Daily.  Dispense: 90 tablet; Refill: 4    3. PVC (premature ventricular contraction)  -     metoprolol succinate XL (TOPROL-XL) 50 MG 24 hr tablet; Take 1 tablet by mouth Daily.  Dispense: 90 tablet; Refill: 4    4. Hypercholesteremia       At baseline her heart rate is upper limit of normal.  Her blood pressure is stable.  Her BMI is around 25.  Her cardiovascular examination is otherwise unremarkable    Regarding the headache, it appears to be more of a stress-induced.  At this time " continue Trintellix for the anxiety.  She is advised to increase her fluid intake    Regarding the palpitation which seems to be coming mostly from PVCs is being treated with Toprol-XL.  She hardly had any more symptoms of continue the same.    Regarding her hypercholesterolemia, just with diet and exercise she has brought on both the LDL as well as the total cholesterol.  She is not on any statin at this time.  Encouraged her to continue the dietary restriction    Regarding history of goiter, thyroid function test still appears completely normal.    Overall cardiac status appears stable.  I will see her back in a year or sooner if needed                  Electronically signed by Jerry De Souza MD August 7, 2024 13:59 EDT

## 2024-08-07 NOTE — PROGRESS NOTES
Chief Complaint   Patient presents with   • Follow-up     For cardiac management, doing well, has been on Wegovy, Dr Pennie has her weaning off to see if she can hold her weight. Has occasional palpitations but seems to be directly related to anxiety.    • Labs     Pt recently had labs, results in [facility].    • Med Refill     Refills needed on Toprol XL, 90 days to [facility] .    • Medication Problem     Pt forgot to take the metoprolol this morning.        CARDIAC COMPLAINTS  Cardiac management        Subjective   Crescencio Kapadia is a 43 y.o. female came in today for her annual follow-up visit.  She has history of chest pain, shortness of breath and palpitation.  She underwent few investigation found to have PAC's and PVCs.  Her echocardiogram showed normal LV function with no shunt.  Stress test showed only breast attenuation.  Her nocturnal pulse oxygen was normal.  She did undergo lab work last month.  TSH was normal cholesterol has come down to 170 and the LDL has come down to 101 with just diet.  Her blood count appears within normal limit sodium was slightly low at 135 rest of them were normal magnesium was normal.  The only symptom she has now is headache.  She does get intermittent headache.  She also being weaned off Wegovy.              Cardiac History  Past Surgical History:   Procedure Laterality Date   • CARDIOVASCULAR STRESS TEST  12/18/2020    6 Min.7.0Mets.104% THR. BP- 171/93. PVC. EF 53%. Breast Attenuation   • CONVERTED (HISTORICAL) HOLTER  02/18/2021    <10 Days.. . Rare PAC and PVC   • ECHO - CONVERTED  12/18/2020    EF 65%. Trace MR. RVSP- 22 mmHg. No shunt   • OTHER SURGICAL HISTORY  09/21/2021    Pulse O2- Normal       Current Outpatient Medications   Medication Sig Dispense Refill   • metoprolol succinate XL (TOPROL-XL) 50 MG 24 hr tablet Take 1 tablet by mouth Daily. 90 tablet 4   • Trintellix 20 MG tablet Take 1 tablet by mouth Daily.     • Wegovy 2.4 MG/0.75ML solution auto-injector Inject 1  "syringe under the skin into the appropriate area as directed. Every 2-3 weeks       No current facility-administered medications for this visit.       Allergies  :  Patient has no known allergies.       Past Medical History:   Diagnosis Date   • Depression    • Hiatal hernia    • Hx of tonsillectomy 2012   • Hypertension    • Insomnia        Social History     Socioeconomic History   • Marital status:    Tobacco Use   • Smoking status: Never   • Smokeless tobacco: Never   Vaping Use   • Vaping status: Never Used   Substance and Sexual Activity   • Alcohol use: Yes     Comment: rare, maybe 1 or 2 per month   • Drug use: Never       Family History   Problem Relation Age of Onset   • Hypertension Mother    • Hypertension Father        Review of Systems   Constitutional: Negative for decreased appetite and malaise/fatigue.   HENT:  Negative for congestion and sore throat.    Eyes:  Negative for blurred vision, double vision and visual disturbance.   Cardiovascular:  Negative for chest pain and palpitations.   Respiratory:  Negative for shortness of breath and snoring.    Endocrine: Negative for cold intolerance and heat intolerance.   Hematologic/Lymphatic: Negative for adenopathy. Does not bruise/bleed easily.   Skin:  Negative for itching, nail changes and skin cancer.   Musculoskeletal:  Negative for arthritis and myalgias.   Gastrointestinal:  Negative for abdominal pain, dysphagia and heartburn.   Genitourinary:  Negative for bladder incontinence and frequency.   Neurological:  Positive for headaches. Negative for dizziness, seizures and vertigo.   Psychiatric/Behavioral:  Negative for altered mental status.    Allergic/Immunologic: Negative for environmental allergies and hives.     Diabetes- No  Thyroid- normal    Objective     /62   Pulse 92   Ht 165.1 cm (65\")   Wt 68.9 kg (152 lb)   BMI 25.29 kg/m²     Vitals and nursing note reviewed.   Constitutional:       Appearance: Healthy appearance. " Not in distress.   Eyes:      Conjunctiva/sclera: Conjunctivae normal.      Pupils: Pupils are equal, round, and reactive to light.   HENT:      Head: Normocephalic.   Pulmonary:      Effort: Pulmonary effort is normal.      Breath sounds: Normal breath sounds.   Cardiovascular:      PMI at left midclavicular line. Normal rate. Regular rhythm.   Abdominal:      General: Bowel sounds are normal.      Palpations: Abdomen is soft.   Musculoskeletal: Normal range of motion.      Cervical back: Normal range of motion and neck supple. Skin:     General: Skin is warm and dry.   Neurological:      Mental Status: Alert, oriented to person, place, and time and oriented to person, place and time.   Procedures            @ASSESSMENT/PLAN@        Diagnoses and all orders for this visit:    1. Headache disorder (Primary)    2. Palpitations  -     metoprolol succinate XL (TOPROL-XL) 50 MG 24 hr tablet; Take 1 tablet by mouth Daily.  Dispense: 90 tablet; Refill: 4    3. PVC (premature ventricular contraction)  -     metoprolol succinate XL (TOPROL-XL) 50 MG 24 hr tablet; Take 1 tablet by mouth Daily.  Dispense: 90 tablet; Refill: 4    4. Hypercholesteremia       At baseline her heart rate is upper limit of normal.  Her blood pressure is stable.  Her BMI is around 25.  Her cardiovascular examination is otherwise unremarkable    Regarding the headache, it appears to be more of a stress-induced.  At this time continue Trintellix for the anxiety.  She is advised to increase her fluid intake    Regarding the palpitation which seems to be coming mostly from PVCs is being treated with Toprol-XL.  She hardly had any more symptoms of continue the same.    Regarding her hypercholesterolemia, just with diet and exercise she has brought on both the LDL as well as the total cholesterol.  She is not on any statin at this time.  Encouraged her to continue the dietary restriction    Regarding history of goiter, thyroid function test still appears  completely normal.    Overall cardiac status appears stable.  I will see her back in a year or sooner if needed                  Electronically signed by Jerry De Souza MD August 7, 2024 13:59 EDT

## 2024-10-21 DIAGNOSIS — I49.3 PVC (PREMATURE VENTRICULAR CONTRACTION): ICD-10-CM

## 2024-10-21 DIAGNOSIS — R00.2 PALPITATIONS: ICD-10-CM

## 2024-10-21 RX ORDER — METOPROLOL SUCCINATE 50 MG/1
50 TABLET, EXTENDED RELEASE ORAL DAILY
Qty: 90 TABLET | Refills: 4 | OUTPATIENT
Start: 2024-10-21

## 2024-10-21 NOTE — TELEPHONE ENCOUNTER
Caller: Crescencio Kapadia    Relationship: Self    Best call back number: [phone] (home)     Requested Prescriptions:   Requested Prescriptions     Pending Prescriptions Disp Refills    metoprolol succinate XL (TOPROL-XL) 50 MG 24 hr tablet 90 tablet 4     Sig: Take 1 tablet by mouth Daily.        Pharmacy where request should be sent: [facility] [address] - [phone]  - [phone]     Last office visit with prescribing clinician: 8/7/2024   Last telemedicine visit with prescribing clinician: Visit date not found   Next office visit with prescribing clinician: 8/13/2025     Additional details provided by patient:     Does the patient have less than a 3 day supply:  [x] Yes  [] No    Would you like a call back once the refill request has been completed: [x] Yes [] No    If the office needs to give you a call back, can they leave a voicemail: [x] Yes [] No    Terrence Hernandez Rep   10/21/24 08:54 EDT

## 2025-08-11 ENCOUNTER — TELEPHONE (OUTPATIENT)
Dept: CARDIOLOGY | Facility: CLINIC | Age: 45
End: 2025-08-11
Payer: COMMERCIAL

## 2025-08-13 ENCOUNTER — OFFICE VISIT (OUTPATIENT)
Dept: CARDIOLOGY | Facility: CLINIC | Age: 45
End: 2025-08-13
Payer: COMMERCIAL

## 2025-08-13 VITALS
DIASTOLIC BLOOD PRESSURE: 86 MMHG | HEART RATE: 111 BPM | SYSTOLIC BLOOD PRESSURE: 122 MMHG | WEIGHT: 173 LBS | HEIGHT: 65 IN | BODY MASS INDEX: 28.82 KG/M2

## 2025-08-13 DIAGNOSIS — R00.0 TACHYCARDIA: ICD-10-CM

## 2025-08-13 DIAGNOSIS — R00.2 PALPITATIONS: Primary | ICD-10-CM

## 2025-08-13 DIAGNOSIS — R51.9 HEADACHE DISORDER: ICD-10-CM

## 2025-08-13 DIAGNOSIS — E04.9 GOITER: ICD-10-CM

## 2025-08-13 DIAGNOSIS — E78.00 HYPERCHOLESTEREMIA: ICD-10-CM

## 2025-08-13 PROCEDURE — 93000 ELECTROCARDIOGRAM COMPLETE: CPT | Performed by: INTERNAL MEDICINE

## 2025-08-13 PROCEDURE — 99214 OFFICE O/P EST MOD 30 MIN: CPT | Performed by: INTERNAL MEDICINE

## 2025-08-13 RX ORDER — PROPRANOLOL HYDROCHLORIDE 80 MG/1
80 CAPSULE, EXTENDED RELEASE ORAL DAILY
Qty: 90 CAPSULE | Refills: 3 | Status: SHIPPED | OUTPATIENT
Start: 2025-08-13

## 2025-08-14 ENCOUNTER — LAB (OUTPATIENT)
Dept: LAB | Facility: HOSPITAL | Age: 45
End: 2025-08-14
Payer: COMMERCIAL

## 2025-08-14 DIAGNOSIS — E78.00 HYPERCHOLESTEREMIA: ICD-10-CM

## 2025-08-14 DIAGNOSIS — R00.2 PALPITATIONS: ICD-10-CM

## 2025-08-14 DIAGNOSIS — R51.9 HEADACHE DISORDER: ICD-10-CM

## 2025-08-14 DIAGNOSIS — E04.9 GOITER: ICD-10-CM

## 2025-08-14 DIAGNOSIS — R00.0 TACHYCARDIA: ICD-10-CM

## 2025-08-14 LAB
ALBUMIN SERPL-MCNC: 4.6 G/DL (ref 3.5–5.2)
ALBUMIN/GLOB SERPL: 1.8 G/DL
ALP SERPL-CCNC: 69 U/L (ref 39–117)
ALT SERPL W P-5'-P-CCNC: 11 U/L (ref 1–33)
ANION GAP SERPL CALCULATED.3IONS-SCNC: 10.8 MMOL/L (ref 5–15)
AST SERPL-CCNC: 17 U/L (ref 1–32)
BASOPHILS # BLD AUTO: 0.06 10*3/MM3 (ref 0–0.2)
BASOPHILS NFR BLD AUTO: 0.9 % (ref 0–1.5)
BILIRUB SERPL-MCNC: 0.5 MG/DL (ref 0–1.2)
BUN SERPL-MCNC: 12 MG/DL (ref 6–20)
BUN/CREAT SERPL: 14 (ref 7–25)
CALCIUM SPEC-SCNC: 9.6 MG/DL (ref 8.6–10.5)
CHLORIDE SERPL-SCNC: 101 MMOL/L (ref 98–107)
CHOLEST SERPL-MCNC: 170 MG/DL (ref 0–200)
CO2 SERPL-SCNC: 24.2 MMOL/L (ref 22–29)
CREAT SERPL-MCNC: 0.86 MG/DL (ref 0.57–1)
DEPRECATED RDW RBC AUTO: 43.8 FL (ref 37–54)
EGFRCR SERPLBLD CKD-EPI 2021: 85 ML/MIN/1.73
EOSINOPHIL # BLD AUTO: 0.13 10*3/MM3 (ref 0–0.4)
EOSINOPHIL NFR BLD AUTO: 1.9 % (ref 0.3–6.2)
ERYTHROCYTE [DISTWIDTH] IN BLOOD BY AUTOMATED COUNT: 12.5 % (ref 12.3–15.4)
ERYTHROCYTE [SEDIMENTATION RATE] IN BLOOD: 9 MM/HR (ref 0–20)
GLOBULIN UR ELPH-MCNC: 2.5 GM/DL
GLUCOSE SERPL-MCNC: 82 MG/DL (ref 65–99)
HCT VFR BLD AUTO: 41.2 % (ref 34–46.6)
HDLC SERPL-MCNC: 46 MG/DL (ref 40–60)
HGB BLD-MCNC: 13.5 G/DL (ref 12–15.9)
IMM GRANULOCYTES # BLD AUTO: 0.06 10*3/MM3 (ref 0–0.05)
IMM GRANULOCYTES NFR BLD AUTO: 0.9 % (ref 0–0.5)
LDLC SERPL CALC-MCNC: 102 MG/DL (ref 0–100)
LDLC/HDLC SERPL: 2.17 {RATIO}
LYMPHOCYTES # BLD AUTO: 2.01 10*3/MM3 (ref 0.7–3.1)
LYMPHOCYTES NFR BLD AUTO: 29 % (ref 19.6–45.3)
MCH RBC QN AUTO: 31.4 PG (ref 26.6–33)
MCHC RBC AUTO-ENTMCNC: 32.8 G/DL (ref 31.5–35.7)
MCV RBC AUTO: 95.8 FL (ref 79–97)
MONOCYTES # BLD AUTO: 0.49 10*3/MM3 (ref 0.1–0.9)
MONOCYTES NFR BLD AUTO: 7.1 % (ref 5–12)
NEUTROPHILS NFR BLD AUTO: 4.19 10*3/MM3 (ref 1.7–7)
NEUTROPHILS NFR BLD AUTO: 60.2 % (ref 42.7–76)
NRBC BLD AUTO-RTO: 0 /100 WBC (ref 0–0.2)
PLATELET # BLD AUTO: 315 10*3/MM3 (ref 140–450)
PMV BLD AUTO: 9.7 FL (ref 6–12)
POTASSIUM SERPL-SCNC: 4.4 MMOL/L (ref 3.5–5.2)
PROT SERPL-MCNC: 7.1 G/DL (ref 6–8.5)
RBC # BLD AUTO: 4.3 10*6/MM3 (ref 3.77–5.28)
SODIUM SERPL-SCNC: 136 MMOL/L (ref 136–145)
T4 FREE SERPL-MCNC: 1.13 NG/DL (ref 0.92–1.68)
TRIGL SERPL-MCNC: 120 MG/DL (ref 0–150)
TSH SERPL DL<=0.05 MIU/L-ACNC: 2.4 UIU/ML (ref 0.27–4.2)
VLDLC SERPL-MCNC: 22 MG/DL (ref 5–40)
WBC NRBC COR # BLD AUTO: 6.94 10*3/MM3 (ref 3.4–10.8)

## 2025-08-14 PROCEDURE — 36415 COLL VENOUS BLD VENIPUNCTURE: CPT

## 2025-08-14 PROCEDURE — 84439 ASSAY OF FREE THYROXINE: CPT

## 2025-08-14 PROCEDURE — 80050 GENERAL HEALTH PANEL: CPT

## 2025-08-14 PROCEDURE — 80061 LIPID PANEL: CPT

## 2025-08-14 PROCEDURE — 85652 RBC SED RATE AUTOMATED: CPT
